# Patient Record
Sex: MALE | Race: WHITE | NOT HISPANIC OR LATINO | Employment: FULL TIME | URBAN - NONMETROPOLITAN AREA
[De-identification: names, ages, dates, MRNs, and addresses within clinical notes are randomized per-mention and may not be internally consistent; named-entity substitution may affect disease eponyms.]

---

## 2021-04-01 DIAGNOSIS — Z23 ENCOUNTER FOR IMMUNIZATION: ICD-10-CM

## 2021-04-02 ENCOUNTER — IMMUNIZATIONS (OUTPATIENT)
Dept: FAMILY MEDICINE CLINIC | Facility: HOSPITAL | Age: 41
End: 2021-04-02

## 2021-04-02 DIAGNOSIS — Z23 ENCOUNTER FOR IMMUNIZATION: Primary | ICD-10-CM

## 2021-04-02 PROCEDURE — 91301 SARS-COV-2 / COVID-19 MRNA VACCINE (MODERNA) 100 MCG: CPT

## 2021-04-02 PROCEDURE — 0011A SARS-COV-2 / COVID-19 MRNA VACCINE (MODERNA) 100 MCG: CPT

## 2021-05-04 ENCOUNTER — IMMUNIZATIONS (OUTPATIENT)
Dept: FAMILY MEDICINE CLINIC | Facility: HOSPITAL | Age: 41
End: 2021-05-04

## 2021-05-04 DIAGNOSIS — Z23 ENCOUNTER FOR IMMUNIZATION: Primary | ICD-10-CM

## 2021-05-04 PROCEDURE — 0012A SARS-COV-2 / COVID-19 MRNA VACCINE (MODERNA) 100 MCG: CPT

## 2021-05-04 PROCEDURE — 91301 SARS-COV-2 / COVID-19 MRNA VACCINE (MODERNA) 100 MCG: CPT

## 2021-05-21 ENCOUNTER — TRANSCRIBE ORDERS (OUTPATIENT)
Dept: ADMINISTRATIVE | Facility: HOSPITAL | Age: 41
End: 2021-05-21

## 2021-05-21 DIAGNOSIS — R59.0 LOCALIZED ENLARGED LYMPH NODES: Primary | ICD-10-CM

## 2021-05-28 ENCOUNTER — TELEPHONE (OUTPATIENT)
Dept: SURGICAL ONCOLOGY | Facility: CLINIC | Age: 41
End: 2021-05-28

## 2021-05-28 NOTE — TELEPHONE ENCOUNTER
New Patient Encounter    New Patient Intake Form   Patient Details:  Cindy Agrawal  1980  [id number]    Background Information:  [address] starts by opening a telephone encounter and gathering the following information   Who is calling to schedule? If not self, relationship to patient? Patient   Referring Provider Dr Radames Durand   What is the diagnosis? lymphadenopathy   Is this diagnosis confirmed? yes   When was the diagnosis? 5/2021   Is there a confirmed diagnosis from a biopsy/tissue reviewed by pathology? No   Were outside slides requested? NA   Is patient aware of diagnosis? yes   Is there a personal history and what kind? No   Is there a family history and what kind? Did Not Speak to Patient / Office Scheduled   Reason for visit? New Diagnosis   Have you had any imaging or labs done? If so: when, where? yes  Radiology and mri   Are records in EPIC? No  Getting them faxed   If patient has a prior history of cancer were old records obtained? NA   Was the patient told to bring a disk? Yes   Does the patient smoke or Vape? Did Not Speak to Patient / Office Scheduled   If yes, how many packs or cartridges per day? Scheduling Information:   Preferred [place]:  [place]     Are there any dates/time the patient cannot be seen? Miscellaneous:   Need ct scan which still is not in chart - office having it faxed and then we will call to schedule pt  After completing the above information, please route to Financial Counselor and the appropriate Nurse Navigator for review

## 2021-06-09 ENCOUNTER — HOSPITAL ENCOUNTER (OUTPATIENT)
Dept: RADIOLOGY | Facility: HOSPITAL | Age: 41
Discharge: HOME | End: 2021-06-09
Attending: RADIOLOGY

## 2021-06-09 ENCOUNTER — TELEPHONE (OUTPATIENT)
Dept: HEMATOLOGY ONCOLOGY | Facility: CLINIC | Age: 41
End: 2021-06-09

## 2021-06-09 DIAGNOSIS — Z71.2 PERSON CONSULTING FOR EXPLANATION OF EXAMINATION OR TEST FINDING: ICD-10-CM

## 2021-06-09 NOTE — TELEPHONE ENCOUNTER
Patient confirmed appt and went over covid screening questions  Patient would also like to confirm his imaging is available for this appt  He stated that he dropped a copy off to the  but does have an extra copy just lebron Angel can be reached at [phone]

## 2021-06-10 ENCOUNTER — CONSULT (OUTPATIENT)
Dept: SURGICAL ONCOLOGY | Facility: CLINIC | Age: 41
End: 2021-06-10
Payer: COMMERCIAL

## 2021-06-10 ENCOUNTER — CONSULT (OUTPATIENT)
Dept: HEMATOLOGY ONCOLOGY | Facility: CLINIC | Age: 41
End: 2021-06-10
Payer: COMMERCIAL

## 2021-06-10 ENCOUNTER — TELEPHONE (OUTPATIENT)
Dept: HEMATOLOGY ONCOLOGY | Facility: CLINIC | Age: 41
End: 2021-06-10

## 2021-06-10 ENCOUNTER — HOSPITAL ENCOUNTER (OUTPATIENT)
Dept: RADIOLOGY | Facility: HOSPITAL | Age: 41
Discharge: HOME | End: 2021-06-10
Attending: STUDENT IN AN ORGANIZED HEALTH CARE EDUCATION/TRAINING PROGRAM
Payer: COMMERCIAL

## 2021-06-10 ENCOUNTER — APPOINTMENT (OUTPATIENT)
Dept: LAB | Facility: HOSPITAL | Age: 41
End: 2021-06-10
Attending: INTERNAL MEDICINE
Payer: COMMERCIAL

## 2021-06-10 VITALS
BODY MASS INDEX: 28.58 KG/M2 | SYSTOLIC BLOOD PRESSURE: 112 MMHG | DIASTOLIC BLOOD PRESSURE: 72 MMHG | WEIGHT: 193 LBS | HEART RATE: 94 BPM | TEMPERATURE: 99.2 F | HEIGHT: 69 IN

## 2021-06-10 VITALS
HEART RATE: 94 BPM | TEMPERATURE: 99.2 F | SYSTOLIC BLOOD PRESSURE: 112 MMHG | DIASTOLIC BLOOD PRESSURE: 72 MMHG | RESPIRATION RATE: 18 BRPM | BODY MASS INDEX: 28.58 KG/M2 | WEIGHT: 193 LBS | HEIGHT: 69 IN | OXYGEN SATURATION: 96 %

## 2021-06-10 DIAGNOSIS — R59.0 LYMPHADENOPATHY, AXILLARY: ICD-10-CM

## 2021-06-10 DIAGNOSIS — R59.0 LYMPHADENOPATHY OF HEAD AND NECK REGION: Primary | ICD-10-CM

## 2021-06-10 DIAGNOSIS — R59.0 LYMPHADENOPATHY OF HEAD AND NECK REGION: ICD-10-CM

## 2021-06-10 DIAGNOSIS — R59.1 LYMPHADENOPATHY: Primary | ICD-10-CM

## 2021-06-10 DIAGNOSIS — R59.1 LYMPHADENOPATHY: ICD-10-CM

## 2021-06-10 LAB
ALBUMIN SERPL BCP-MCNC: 3.6 G/DL (ref 3.5–5)
ALP SERPL-CCNC: 210 U/L (ref 46–116)
ALT SERPL W P-5'-P-CCNC: 43 U/L (ref 12–78)
ANION GAP SERPL CALCULATED.3IONS-SCNC: 3 MMOL/L (ref 4–13)
AST SERPL W P-5'-P-CCNC: 33 U/L (ref 5–45)
BASOPHILS # BLD AUTO: 0.26 THOUSANDS/ΜL (ref 0–0.1)
BASOPHILS NFR BLD AUTO: 2 % (ref 0–1)
BILIRUB SERPL-MCNC: 0.6 MG/DL (ref 0.2–1)
BUN SERPL-MCNC: 6 MG/DL (ref 5–25)
CALCIUM SERPL-MCNC: 9.3 MG/DL (ref 8.3–10.1)
CHLORIDE SERPL-SCNC: 107 MMOL/L (ref 100–108)
CO2 SERPL-SCNC: 31 MMOL/L (ref 21–32)
CREAT SERPL-MCNC: 0.77 MG/DL (ref 0.6–1.3)
EOSINOPHIL # BLD AUTO: 3.91 THOUSAND/ΜL (ref 0–0.61)
EOSINOPHIL NFR BLD AUTO: 32 % (ref 0–6)
ERYTHROCYTE [DISTWIDTH] IN BLOOD BY AUTOMATED COUNT: 13 % (ref 11.6–15.1)
ERYTHROCYTE [SEDIMENTATION RATE] IN BLOOD: 6 MM/HOUR (ref 0–14)
GFR SERPL CREATININE-BSD FRML MDRD: 114 ML/MIN/1.73SQ M
GLUCOSE P FAST SERPL-MCNC: 83 MG/DL (ref 65–99)
HCT VFR BLD AUTO: 44.7 % (ref 36.5–49.3)
HGB BLD-MCNC: 14.3 G/DL (ref 12–17)
IMM GRANULOCYTES # BLD AUTO: 0.05 THOUSAND/UL (ref 0–0.2)
IMM GRANULOCYTES NFR BLD AUTO: 0 % (ref 0–2)
LDH SERPL-CCNC: 936 U/L (ref 81–234)
LYMPHOCYTES # BLD AUTO: 2.44 THOUSANDS/ΜL (ref 0.6–4.47)
LYMPHOCYTES NFR BLD AUTO: 20 % (ref 14–44)
MCH RBC QN AUTO: 30.7 PG (ref 26.8–34.3)
MCHC RBC AUTO-ENTMCNC: 32 G/DL (ref 31.4–37.4)
MCV RBC AUTO: 96 FL (ref 82–98)
MONOCYTES # BLD AUTO: 1.21 THOUSAND/ΜL (ref 0.17–1.22)
MONOCYTES NFR BLD AUTO: 10 % (ref 4–12)
NEUTROPHILS # BLD AUTO: 4.43 THOUSANDS/ΜL (ref 1.85–7.62)
NEUTS SEG NFR BLD AUTO: 36 % (ref 43–75)
NRBC BLD AUTO-RTO: 0 /100 WBCS
PLATELET # BLD AUTO: 248 THOUSANDS/UL (ref 149–390)
PMV BLD AUTO: 10.8 FL (ref 8.9–12.7)
POTASSIUM SERPL-SCNC: 4.6 MMOL/L (ref 3.5–5.3)
PROT SERPL-MCNC: 7.2 G/DL (ref 6.4–8.2)
RBC # BLD AUTO: 4.66 MILLION/UL (ref 3.88–5.62)
SODIUM SERPL-SCNC: 141 MMOL/L (ref 136–145)
WBC # BLD AUTO: 12.3 THOUSAND/UL (ref 4.31–10.16)

## 2021-06-10 PROCEDURE — 85025 COMPLETE CBC W/AUTO DIFF WBC: CPT

## 2021-06-10 PROCEDURE — 83615 LACTATE (LD) (LDH) ENZYME: CPT

## 2021-06-10 PROCEDURE — 1036F TOBACCO NON-USER: CPT | Performed by: STUDENT IN AN ORGANIZED HEALTH CARE EDUCATION/TRAINING PROGRAM

## 2021-06-10 PROCEDURE — 99244 OFF/OP CNSLTJ NEW/EST MOD 40: CPT | Performed by: STUDENT IN AN ORGANIZED HEALTH CARE EDUCATION/TRAINING PROGRAM

## 2021-06-10 PROCEDURE — 80053 COMPREHEN METABOLIC PANEL: CPT

## 2021-06-10 PROCEDURE — 85652 RBC SED RATE AUTOMATED: CPT

## 2021-06-10 PROCEDURE — 36415 COLL VENOUS BLD VENIPUNCTURE: CPT

## 2021-06-10 PROCEDURE — 99244 OFF/OP CNSLTJ NEW/EST MOD 40: CPT | Performed by: INTERNAL MEDICINE

## 2021-06-10 PROCEDURE — 3008F BODY MASS INDEX DOCD: CPT | Performed by: STUDENT IN AN ORGANIZED HEALTH CARE EDUCATION/TRAINING PROGRAM

## 2021-06-10 PROCEDURE — 71046 X-RAY EXAM CHEST 2 VIEWS: CPT

## 2021-06-10 RX ORDER — TRAMADOL HYDROCHLORIDE 50 MG/1
50 TABLET ORAL EVERY 6 HOURS PRN
Qty: 15 TABLET | Refills: 0 | Status: SHIPPED | OUTPATIENT
Start: 2021-06-10 | End: 2021-06-23

## 2021-06-10 RX ORDER — CEFAZOLIN SODIUM 2 G/50ML
2000 SOLUTION INTRAVENOUS ONCE
Status: CANCELLED | OUTPATIENT
Start: 2021-06-10 | End: 2021-06-10

## 2021-06-10 NOTE — PROGRESS NOTES
Surgical Oncology Consultation    [address] [facility] CANCER CARE SURGICAL ONCOLOGY [address] [facility]  [phone]    Patient:  Nilsa Alvarado  1980  [id number]    Primary Care provider:  Shiloh Olivarez, [address] [place] [address]    Referring provider:  No referring provider defined for this encounter  Diagnoses and all orders for this visit:    Lymphadenopathy  -     Case request operating room: EXCISIONAL BIOPSY OF LYMPH NODE WITH LYMPHOMA PROTOCOL, RIGHT NECK; Standing    Other orders  -     Incentive spirometry; Standing  -     Insert and maintain IV line; Standing  -     Void On-Call to O R ; Standing  -     Place sequential compression device; Standing  -     XR chest pa & lateral; Future  -     ceFAZolin (ANCEF) IVPB (premix in dextrose) 2,000 mg 50 mL  -     traMADol (ULTRAM) 50 mg tablet; Take 1 tablet (50 mg total) by mouth every 6 (six) hours as needed for moderate pain        Chief Complaint   Patient presents with    Consult     Pt here for consult for cervical and axillary lymphadenopathy  No follow-ups on file  Oncology History    No history exists  History of Present Illness  :   Naeem Cupl is a 52-year-old male seen here today for lymphadenopathy  Briefly, he states that he received his 2nd dose of the coronavirus vaccine in mid May  He had enlarged lymph nodes on the side of the vaccine, but when this did not resolve after 2 weeks and continued to grow, he sought the opinion of his primary care physician  A CT scan of the neck was obtained, showing bilateral cervical as well as mediastinal and preauricular lymph nodes  He was referred to Medical Oncology, who recommended excisional lymph node biopsy, he was thus referred to me today  He denies any symptomatology including fevers, night sweats, weight loss, tachycardia, diarrhea, nausea or vomiting        I personally reviewed this patient's clinical history, Hematology-Oncology consultation, reports of the CT scans  The images are unavailable for review at this time  I discussed the patient with Dr Lila Herrera  Review of Systems  Complete ROS Surg Onc:   Constitutional: The patient denies new or recent history of general fatigue, no recent weight loss, no change in appetite  Eyes: No complaints of visual problems, no scleral icterus  ENT: No complaints of ear pain, no hoarseness, no difficulty swallowing,  no tinnitus and no new masses in head, oral cavity, or neck  Cardiovascular: No complaints of chest pain, no palpitations, no ankle edema  Respiratory: No complaints of shortness of breath, no cough  Gastrointestinal: No complaints of jaundice, no bloody stools, no pale stools  Genitourinary: No complaints of dysuria, no hematuria, no nocturia, no frequent urination, no urethral discharge  Musculoskeletal: No complaints of weakness, paralysis, joint stiffness or arthralgias  Integumentary: No complaints of rash, no new lesions  Neurological: No complaints of convulsions, no seizures, no dizziness  Hematologic/Lymphatic: No complaints of easy bruising  Endocrine:  No hot or cold intolerance  No polydipsia, polyphagia, or polyuria  Allergy/immunology:  No environmental allergies  No food allergies  Not immunocompromised  Patient Active Problem List   Diagnosis    Lymphadenopathy     No past medical history on file  No past surgical history on file    Family History   Problem Relation Age of Onset    Cancer Maternal Grandfather      Social History     Socioeconomic History    Marital status: Single     Spouse name: Not on file    Number of children: Not on file    Years of education: Not on file    Highest education level: Not on file   Occupational History    Not on file   Social Needs    Financial resource strain: Not on file    Food insecurity     Worry: Not on file     Inability: Not on file   [employer] needs Medical: Not on file     Non-medical: Not on file   Tobacco Use    Smoking status: Former Smoker     Types: Cigarettes     Quit date: 2016     Years since quittin 4    Smokeless tobacco: Never Used   Substance and Sexual Activity    Alcohol use: Yes     Comment: rare    Drug use: Not on file    Sexual activity: Not on file   Lifestyle    Physical activity     Days per week: Not on file     Minutes per session: Not on file    Stress: Not on file   Relationships    Social connections     Talks on phone: Not on file     Gets together: Not on file     Attends Yarsani service: Not on file     Active member of club or organization: Not on file     Attends meetings of clubs or organizations: Not on file     Relationship status: Not on file    Intimate partner violence     Fear of current or ex partner: Not on file     Emotionally abused: Not on file     Physically abused: Not on file     Forced sexual activity: Not on file   Other Topics Concern    Not on file   Social History Narrative    Not on file     No current outpatient medications on file  No Known Allergies    Vitals:    06/10/21 1104   BP: 112/72   Pulse: 94   Temp: 99 2 °F (37 3 °C)       Physical Exam   General: Appears well, appears stated age  Skin: Warm, anicteric  HEENT: Normocephalic, atraumatic; sclera aniceteric, mucous membranes moist; cervical nodes without adenopathy  Cardiopulmonary: RRR, Easy WOB, no BLE edema  Abd: Flat and soft, nontender, no masses appreciated, no hepatosplenomegaly  MSK: Symmetric, no cyanosis, no overt weakness  Lymphatic: Significant bulky and mobile cervical, posterior auricular, axillary and inguinal lymphadenopathy  Neuro: Affect appropriate, no gross motor abnormalities    Labs: Reviewed in EPIC    Imaging  No results found  I independently reviewed and interpreted the above laboratory and imaging data  Discussion/Summary:      This is a 41-year-old gentleman with bilateral palpable cervical, axillary, and inguinal lymphadenopathy  He is asymptomatic, however the findings were concerning for lymphoproliferative disorder  Dr Kate Warren saw the patient today and has requested excisional biopsy  Given the increased risk of infection for axillary or inguinal incision, I have recommended right neck node excision  The risks of the procedure include bleeding, infection, seroma, injury to deeper structures  These risks were reviewed in full with the patient and we will proceed as soon as possible  I described that further treatment will be dictated by pathology  He expresses understanding

## 2021-06-10 NOTE — TELEPHONE ENCOUNTER
Intra-Department Referral    Patient Details:  Barbara Rivers  1980  [id number]   Background Information:  [address] starts by opening a telephone encounter and gathering the following information   Who is calling to schedule and relationship? Provider   Diagnosis Lymphadenopathy   Is this Cancer or Non-Cancer? Non-Cancer   What department was patient seen in last? Surg Onc   Scheduling Information:    [facility]   Are there any days the patient cannot be seen? Miscellaneous: Pt scheduled 6/10/21 w/Dr Drake Gillespie    After completing the above information, please route to nurse navigation, clinical trials (for re-evaluation) and [place]dal[place]

## 2021-06-10 NOTE — TELEPHONE ENCOUNTER
Spoke with pt and let him know that we did receive the hard copies and the images are also uploaded into PACS  Pt voiced understanding

## 2021-06-10 NOTE — LETTER
Azul 10, 2021     Corine Pierre,   [address] [facility] [address]    Patient: Cydney Gill   YOB: 1980   Date of Visit: 6/10/2021       Dear Dr Kwabena Ulloa: Thank you for referring Ronen Tim to me for evaluation  Below are my notes for this consultation  If you have questions, please do not hesitate to call me  I look forward to following your patient along with you  Sincerely,        Martín Diaz MD        CC: No Recipients  Martín Diaz MD  6/10/2021 11:57 AM  Sign when Signing Visit  Surgical Oncology Consultation    [place] [address]  [address] 12[address] [facility]  [phone]    Patient:  Cydney Gill  1980  [id number]    Primary Care provider:  Jordan Walters, [address] [place] [address]    Referring provider:  No referring provider defined for this encounter  Diagnoses and all orders for this visit:    Lymphadenopathy  -     Case request operating room: EXCISIONAL BIOPSY OF LYMPH NODE WITH LYMPHOMA PROTOCOL, RIGHT NECK; Standing    Other orders  -     Incentive spirometry; Standing  -     Insert and maintain IV line; Standing  -     Void On-Call to O R ; Standing  -     Place sequential compression device; Standing  -     XR chest pa & lateral; Future  -     ceFAZolin (ANCEF) IVPB (premix in dextrose) 2,000 mg 50 mL  -     traMADol (ULTRAM) 50 mg tablet; Take 1 tablet (50 mg total) by mouth every 6 (six) hours as needed for moderate pain        Chief Complaint   Patient presents with    Consult     Pt here for consult for cervical and axillary lymphadenopathy  No follow-ups on file  Oncology History    No history exists  History of Present Illness  :   Travis Guillaume is a 61-year-old male seen here today for lymphadenopathy  Briefly, he states that he received his 2nd dose of the coronavirus vaccine in mid May    He had enlarged lymph nodes on the side of the vaccine, but when this did not resolve after 2 weeks and continued to grow, he sought the opinion of his primary care physician  A CT scan of the neck was obtained, showing bilateral cervical as well as mediastinal and preauricular lymph nodes  He was referred to Medical Oncology, who recommended excisional lymph node biopsy, he was thus referred to me today  He denies any symptomatology including fevers, night sweats, weight loss, tachycardia, diarrhea, nausea or vomiting  I personally reviewed this patient's clinical history, Hematology-Oncology consultation, reports of the CT scans  The images are unavailable for review at this time  I discussed the patient with Dr Francis Rebollar  Review of Systems  Complete ROS Surg Onc:   Constitutional: The patient denies new or recent history of general fatigue, no recent weight loss, no change in appetite  Eyes: No complaints of visual problems, no scleral icterus  ENT: No complaints of ear pain, no hoarseness, no difficulty swallowing,  no tinnitus and no new masses in head, oral cavity, or neck  Cardiovascular: No complaints of chest pain, no palpitations, no ankle edema  Respiratory: No complaints of shortness of breath, no cough  Gastrointestinal: No complaints of jaundice, no bloody stools, no pale stools  Genitourinary: No complaints of dysuria, no hematuria, no nocturia, no frequent urination, no urethral discharge  Musculoskeletal: No complaints of weakness, paralysis, joint stiffness or arthralgias  Integumentary: No complaints of rash, no new lesions  Neurological: No complaints of convulsions, no seizures, no dizziness  Hematologic/Lymphatic: No complaints of easy bruising  Endocrine:  No hot or cold intolerance  No polydipsia, polyphagia, or polyuria  Allergy/immunology:  No environmental allergies  No food allergies  Not immunocompromised        Patient Active Problem List   Diagnosis    Lymphadenopathy     No past medical history on file  No past surgical history on file  Family History   Problem Relation Age of Onset    Cancer Maternal Grandfather      Social History     Socioeconomic History    Marital status: Single     Spouse name: Not on file    Number of children: Not on file    Years of education: Not on file    Highest education level: Not on file   Occupational History    Not on file   Social Needs    Financial resource strain: Not on file    Food insecurity     Worry: Not on file     Inability: Not on file    Transportation needs     Medical: Not on file     Non-medical: Not on file   Tobacco Use    Smoking status: Former Smoker     Types: Cigarettes     Quit date:      Years since quittin 4    Smokeless tobacco: Never Used   Substance and Sexual Activity    Alcohol use: Yes     Comment: rare    Drug use: Not on file    Sexual activity: Not on file   Lifestyle    Physical activity     Days per week: Not on file     Minutes per session: Not on file    Stress: Not on file   Relationships    Social connections     Talks on phone: Not on file     Gets together: Not on file     Attends Religion service: Not on file     Active member of club or organization: Not on file     Attends meetings of clubs or organizations: Not on file     Relationship status: Not on file    Intimate partner violence     Fear of current or ex partner: Not on file     Emotionally abused: Not on file     Physically abused: Not on file     Forced sexual activity: Not on file   Other Topics Concern    Not on file   Social History Narrative    Not on file     No current outpatient medications on file    No Known Allergies    Vitals:    06/10/21 1104   BP: 112/72   Pulse: 94   Temp: 99 2 °F (37 3 °C)       Physical Exam   General: Appears well, appears stated age  Skin: Warm, anicteric  HEENT: Normocephalic, atraumatic; sclera aniceteric, mucous membranes moist; cervical nodes without adenopathy  Cardiopulmonary: RRR, Easy WOB, no BLE edema  Abd: Flat and soft, nontender, no masses appreciated, no hepatosplenomegaly  MSK: Symmetric, no cyanosis, no overt weakness  Lymphatic: Significant bulky and mobile cervical, posterior auricular, axillary and inguinal lymphadenopathy  Neuro: Affect appropriate, no gross motor abnormalities    Labs: Reviewed in EPIC    Imaging  No results found  I independently reviewed and interpreted the above laboratory and imaging data  Discussion/Summary: This is a 64-year-old gentleman with bilateral palpable cervical, axillary, and inguinal lymphadenopathy  He is asymptomatic, however the findings were concerning for lymphoproliferative disorder  Dr Flor Jesus saw the patient today and has requested excisional biopsy  Given the increased risk of infection for axillary or inguinal incision, I have recommended right neck node excision  The risks of the procedure include bleeding, infection, seroma, injury to deeper structures  These risks were reviewed in full with the patient and we will proceed as soon as possible  I described that further treatment will be dictated by pathology  He expresses understanding

## 2021-06-10 NOTE — H&P (VIEW-ONLY)
Surgical Oncology Consultation    [address] [facility] CANCER CARE SURGICAL ONCOLOGY Oskaratto Rhodes  [address] [place] [address] Drive [address] [facility]  [phone]    Patient:  Radha Camera  1980  [id number]    Primary Care provider:  Holly Delgadillo, [address] [place] [id number]    Referring provider:  No referring provider defined for this encounter  Diagnoses and all orders for this visit:    Lymphadenopathy  -     Case request operating room: EXCISIONAL BIOPSY OF LYMPH NODE WITH LYMPHOMA PROTOCOL, RIGHT NECK; Standing    Other orders  -     Incentive spirometry; Standing  -     Insert and maintain IV line; Standing  -     Void On-Call to O R ; Standing  -     Place sequential compression device; Standing  -     XR chest pa & lateral; Future  -     ceFAZolin (ANCEF) IVPB (premix in dextrose) 2,000 mg 50 mL  -     traMADol (ULTRAM) 50 mg tablet; Take 1 tablet (50 mg total) by mouth every 6 (six) hours as needed for moderate pain        Chief Complaint   Patient presents with    Consult     Pt here for consult for cervical and axillary lymphadenopathy  No follow-ups on file  Oncology History    No history exists  History of Present Illness  :   Amandeep Vasquze is a 78-year-old male seen here today for lymphadenopathy  Briefly, he states that he received his 2nd dose of the coronavirus vaccine in mid May  He had enlarged lymph nodes on the side of the vaccine, but when this did not resolve after 2 weeks and continued to grow, he sought the opinion of his primary care physician  A CT scan of the neck was obtained, showing bilateral cervical as well as mediastinal and preauricular lymph nodes  He was referred to Medical Oncology, who recommended excisional lymph node biopsy, he was thus referred to me today  He denies any symptomatology including fevers, night sweats, weight loss, tachycardia, diarrhea, nausea or vomiting        I personally reviewed this patient's clinical history, Hematology-Oncology consultation, reports of the CT scans  The images are unavailable for review at this time  I discussed the patient with Dr Lizette Victoria  Review of Systems  Complete ROS Surg Onc:   Constitutional: The patient denies new or recent history of general fatigue, no recent weight loss, no change in appetite  Eyes: No complaints of visual problems, no scleral icterus  ENT: No complaints of ear pain, no hoarseness, no difficulty swallowing,  no tinnitus and no new masses in head, oral cavity, or neck  Cardiovascular: No complaints of chest pain, no palpitations, no ankle edema  Respiratory: No complaints of shortness of breath, no cough  Gastrointestinal: No complaints of jaundice, no bloody stools, no pale stools  Genitourinary: No complaints of dysuria, no hematuria, no nocturia, no frequent urination, no urethral discharge  Musculoskeletal: No complaints of weakness, paralysis, joint stiffness or arthralgias  Integumentary: No complaints of rash, no new lesions  Neurological: No complaints of convulsions, no seizures, no dizziness  Hematologic/Lymphatic: No complaints of easy bruising  Endocrine:  No hot or cold intolerance  No polydipsia, polyphagia, or polyuria  Allergy/immunology:  No environmental allergies  No food allergies  Not immunocompromised  Patient Active Problem List   Diagnosis    Lymphadenopathy     No past medical history on file  No past surgical history on file    Family History   Problem Relation Age of Onset    Cancer Maternal Grandfather      Social History     Socioeconomic History    Marital status: Single     Spouse name: Not on file    Number of children: Not on file    Years of education: Not on file    Highest education level: Not on file   Occupational History    Not on file   Social Needs    Financial resource strain: Not on file    Food insecurity     Worry: Not on file     Inability: Not on file   [employer] needs Medical: Not on file     Non-medical: Not on file   Tobacco Use    Smoking status: Former Smoker     Types: Cigarettes     Quit date: 2016     Years since quittin 4    Smokeless tobacco: Never Used   Substance and Sexual Activity    Alcohol use: Yes     Comment: rare    Drug use: Not on file    Sexual activity: Not on file   Lifestyle    Physical activity     Days per week: Not on file     Minutes per session: Not on file    Stress: Not on file   Relationships    Social connections     Talks on phone: Not on file     Gets together: Not on file     Attends Worship service: Not on file     Active member of club or organization: Not on file     Attends meetings of clubs or organizations: Not on file     Relationship status: Not on file    Intimate partner violence     Fear of current or ex partner: Not on file     Emotionally abused: Not on file     Physically abused: Not on file     Forced sexual activity: Not on file   Other Topics Concern    Not on file   Social History Narrative    Not on file     No current outpatient medications on file  No Known Allergies    Vitals:    06/10/21 1104   BP: 112/72   Pulse: 94   Temp: 99 2 °F (37 3 °C)       Physical Exam   General: Appears well, appears stated age  Skin: Warm, anicteric  HEENT: Normocephalic, atraumatic; sclera aniceteric, mucous membranes moist; cervical nodes without adenopathy  Cardiopulmonary: RRR, Easy WOB, no BLE edema  Abd: Flat and soft, nontender, no masses appreciated, no hepatosplenomegaly  MSK: Symmetric, no cyanosis, no overt weakness  Lymphatic: Significant bulky and mobile cervical, posterior auricular, axillary and inguinal lymphadenopathy  Neuro: Affect appropriate, no gross motor abnormalities    Labs: Reviewed in EPIC    Imaging  No results found  I independently reviewed and interpreted the above laboratory and imaging data  Discussion/Summary:      This is a 28-year-old gentleman with bilateral palpable cervical, axillary, and inguinal lymphadenopathy  He is asymptomatic, however the findings were concerning for lymphoproliferative disorder  Dr Rico Dias saw the patient today and has requested excisional biopsy  Given the increased risk of infection for axillary or inguinal incision, I have recommended right neck node excision  The risks of the procedure include bleeding, infection, seroma, injury to deeper structures  These risks were reviewed in full with the patient and we will proceed as soon as possible  I described that further treatment will be dictated by pathology  He expresses understanding

## 2021-06-11 NOTE — PRE-PROCEDURE INSTRUCTIONS
Pre-Surgery Instructions:   Medication Instructions    traMADol (ULTRAM) 50 mg tablet Post Op      My Surgical Experience    The following information was developed to assist you to prepare for your operation  What do I need to do before coming to the hospital?   Arrange for a responsible person to drive you to and from the hospital    Arrange care for your children at home  Children are not allowed in the recovery areas of the hospital   Plan to wear clothing that is easy to put on and take off  If you are having shoulder surgery, wear a shirt that buttons or zippers in the front  Bathing  o Shower the evening before and the morning of your surgery with an antibacterial soap  Please refer to the Pre Op Showering Instructions for Surgery Patients Sheet   o Remove nail polish and all body piercing jewelry  o Do not shave any body part for at least 24 hours before surgery-this includes face, arms, legs and upper body  Food  o Nothing to eat or drink after midnight the night before your surgery  This includes candy and chewing gum  o Exception: If your surgery is after 12:00pm (noon), you may have clear liquids such as 7-Up®, ginger ale, apple or cranberry juice, Jell-O®, water, or clear broth until 8:00 am  o Do not drink milk or juice with pulp on the morning before surgery  o Do not drink alcohol 24 hours before surgery  Medicine  o Follow instructions you received from your surgeon about which medicines you may take on the day of surgery  o If instructed to take medicine on the morning of surgery, take pills with just a small sip of water  Call your prescribing doctor for specific infroamtion on what to do if you take insulin    What should I bring to the hospital?    Bring:  Shannan Bojorquez or a walker, if you have them, for foot or knee surgery   A list of the daily medicines, vitamins, minerals, herbals and nutritional supplements you take   Include the dosages of medicines and the time you take them each day   Glasses, dentures or hearing aids   Minimal clothing; you will be wearing hospital sleepwear   Photo ID; required to verify your identity   If you have a Living Will or Power of , bring a copy of the documents   If you have an ostomy, bring an extra pouch and any supplies you use    Do not bring   Medicines or inhalers   Money, valuables or jewelry    What other information should I know about the day of surgery?  Notify your surgeons if you develop a cold, sore throat, cough, fever, rash or any other illness   Report to the Ambulatory Surgical/Same Day Surgery Unit   You will be instructed to stop at Registration only if you have not been pre-registered   Inform your  fi they do not stay that they will be asked by the staff to leave a phone number where they can be reached   Be available to be reached before surgery  In the event the operating room schedule changes, you may be asked to come in earlier or later than expected    *It is important to tell your doctor and others involved in your health care if you are taking or have been taking any non-prescription drugs, vitamins, minerals, herbals or other nutritional supplements   Any of these may interact with some food or medicines and cause a reaction

## 2021-06-15 ENCOUNTER — TELEPHONE (OUTPATIENT)
Dept: SURGICAL ONCOLOGY | Facility: CLINIC | Age: 41
End: 2021-06-15

## 2021-06-15 NOTE — TELEPHONE ENCOUNTER
Dr Shital Oconnor feels strongly that he should proceed with surgery  Left a vm for pt that he can come in on Thursday to be seen if he wants

## 2021-06-15 NOTE — TELEPHONE ENCOUNTER
Patient has surgery set for 6/17 with Dr Allan Joy  He stated that the swelling has gone down since the last time he was seen in the office and would like to know if he should be seen once more before the surgery       Patient can be reached at [phone]

## 2021-06-16 ENCOUNTER — ANESTHESIA EVENT (OUTPATIENT)
Dept: PERIOP | Facility: HOSPITAL | Age: 41
End: 2021-06-16
Payer: COMMERCIAL

## 2021-06-17 ENCOUNTER — HOSPITAL ENCOUNTER (OUTPATIENT)
Facility: HOSPITAL | Age: 41
Setting detail: OUTPATIENT SURGERY
Discharge: HOME | End: 2021-06-17
Attending: STUDENT IN AN ORGANIZED HEALTH CARE EDUCATION/TRAINING PROGRAM | Admitting: STUDENT IN AN ORGANIZED HEALTH CARE EDUCATION/TRAINING PROGRAM
Payer: COMMERCIAL

## 2021-06-17 ENCOUNTER — ANESTHESIA (OUTPATIENT)
Dept: PERIOP | Facility: HOSPITAL | Age: 41
End: 2021-06-17
Payer: COMMERCIAL

## 2021-06-17 VITALS
HEART RATE: 113 BPM | RESPIRATION RATE: 18 BRPM | WEIGHT: 190 LBS | DIASTOLIC BLOOD PRESSURE: 90 MMHG | BODY MASS INDEX: 28.14 KG/M2 | SYSTOLIC BLOOD PRESSURE: 135 MMHG | HEIGHT: 69 IN | OXYGEN SATURATION: 92 % | TEMPERATURE: 97.8 F

## 2021-06-17 DIAGNOSIS — R59.1 LYMPHADENOPATHY: ICD-10-CM

## 2021-06-17 PROCEDURE — 81407 MOPATH PROCEDURE LEVEL 8: CPT

## 2021-06-17 PROCEDURE — 81347 SF3B1 GENE COMMON VARIANTS: CPT

## 2021-06-17 PROCEDURE — 88341 IMHCHEM/IMCYTCHM EA ADD ANTB: CPT | Performed by: PATHOLOGY

## 2021-06-17 PROCEDURE — 38510 BIOPSY/REMOVAL LYMPH NODES: CPT | Performed by: STUDENT IN AN ORGANIZED HEALTH CARE EDUCATION/TRAINING PROGRAM

## 2021-06-17 PROCEDURE — 88360 TUMOR IMMUNOHISTOCHEM/MANUAL: CPT

## 2021-06-17 PROCEDURE — 88342 IMHCHEM/IMCYTCHM 1ST ANTB: CPT

## 2021-06-17 PROCEDURE — 88185 FLOWCYTOMETRY/TC ADD-ON: CPT

## 2021-06-17 PROCEDURE — 88342 IMHCHEM/IMCYTCHM 1ST ANTB: CPT | Performed by: PATHOLOGY

## 2021-06-17 PROCEDURE — 88365 INSITU HYBRIDIZATION (FISH): CPT

## 2021-06-17 PROCEDURE — 88184 FLOWCYTOMETRY/ TC 1 MARKER: CPT | Performed by: STUDENT IN AN ORGANIZED HEALTH CARE EDUCATION/TRAINING PROGRAM

## 2021-06-17 PROCEDURE — 81305 MYD88 GENE P.LEU265PRO VRNT: CPT

## 2021-06-17 PROCEDURE — 88331 PATH CONSLTJ SURG 1 BLK 1SPC: CPT | Performed by: PATHOLOGY

## 2021-06-17 PROCEDURE — 88305 TISSUE EXAM BY PATHOLOGIST: CPT | Performed by: PATHOLOGY

## 2021-06-17 PROCEDURE — 81351 TP53 GENE FULL GENE SEQUENCE: CPT

## 2021-06-17 PROCEDURE — 81342 TRG GENE REARRANGEMENT ANAL: CPT

## 2021-06-17 PROCEDURE — 81236 EZH2 GENE FULL GENE SEQUENCE: CPT

## 2021-06-17 PROCEDURE — 81479 UNLISTED MOLECULAR PATHOLOGY: CPT

## 2021-06-17 PROCEDURE — 81120 IDH1 COMMON VARIANTS: CPT

## 2021-06-17 PROCEDURE — 81210 BRAF GENE: CPT

## 2021-06-17 PROCEDURE — 88341 IMHCHEM/IMCYTCHM EA ADD ANTB: CPT

## 2021-06-17 RX ORDER — SODIUM CHLORIDE, SODIUM LACTATE, POTASSIUM CHLORIDE, CALCIUM CHLORIDE 600; 310; 30; 20 MG/100ML; MG/100ML; MG/100ML; MG/100ML
125 INJECTION, SOLUTION INTRAVENOUS CONTINUOUS
Status: DISCONTINUED | OUTPATIENT
Start: 2021-06-17 | End: 2021-06-17 | Stop reason: HOSPADM

## 2021-06-17 RX ORDER — NEOSTIGMINE METHYLSULFATE 1 MG/ML
INJECTION INTRAVENOUS AS NEEDED
Status: DISCONTINUED | OUTPATIENT
Start: 2021-06-17 | End: 2021-06-17

## 2021-06-17 RX ORDER — SODIUM CHLORIDE, SODIUM LACTATE, POTASSIUM CHLORIDE, CALCIUM CHLORIDE 600; 310; 30; 20 MG/100ML; MG/100ML; MG/100ML; MG/100ML
INJECTION, SOLUTION INTRAVENOUS CONTINUOUS PRN
Status: DISCONTINUED | OUTPATIENT
Start: 2021-06-17 | End: 2021-06-17

## 2021-06-17 RX ORDER — MIDAZOLAM HYDROCHLORIDE 2 MG/2ML
INJECTION, SOLUTION INTRAMUSCULAR; INTRAVENOUS AS NEEDED
Status: DISCONTINUED | OUTPATIENT
Start: 2021-06-17 | End: 2021-06-17

## 2021-06-17 RX ORDER — ONDANSETRON 2 MG/ML
4 INJECTION INTRAMUSCULAR; INTRAVENOUS ONCE AS NEEDED
Status: DISCONTINUED | OUTPATIENT
Start: 2021-06-17 | End: 2021-06-17 | Stop reason: HOSPADM

## 2021-06-17 RX ORDER — SUCCINYLCHOLINE/SOD CL,ISO/PF 100 MG/5ML
SYRINGE (ML) INTRAVENOUS AS NEEDED
Status: DISCONTINUED | OUTPATIENT
Start: 2021-06-17 | End: 2021-06-17

## 2021-06-17 RX ORDER — LIDOCAINE HYDROCHLORIDE 10 MG/ML
0.5 INJECTION, SOLUTION EPIDURAL; INFILTRATION; INTRACAUDAL; PERINEURAL ONCE AS NEEDED
Status: COMPLETED | OUTPATIENT
Start: 2021-06-17 | End: 2021-06-17

## 2021-06-17 RX ORDER — ONDANSETRON 2 MG/ML
INJECTION INTRAMUSCULAR; INTRAVENOUS AS NEEDED
Status: DISCONTINUED | OUTPATIENT
Start: 2021-06-17 | End: 2021-06-17

## 2021-06-17 RX ORDER — ROCURONIUM BROMIDE 10 MG/ML
INJECTION, SOLUTION INTRAVENOUS AS NEEDED
Status: DISCONTINUED | OUTPATIENT
Start: 2021-06-17 | End: 2021-06-17

## 2021-06-17 RX ORDER — CEFAZOLIN SODIUM 2 G/50ML
2000 SOLUTION INTRAVENOUS ONCE
Status: COMPLETED | OUTPATIENT
Start: 2021-06-17 | End: 2021-06-17

## 2021-06-17 RX ORDER — SODIUM CHLORIDE, SODIUM LACTATE, POTASSIUM CHLORIDE, CALCIUM CHLORIDE 600; 310; 30; 20 MG/100ML; MG/100ML; MG/100ML; MG/100ML
100 INJECTION, SOLUTION INTRAVENOUS CONTINUOUS
Status: CANCELLED | OUTPATIENT
Start: 2021-06-17

## 2021-06-17 RX ORDER — DEXAMETHASONE SODIUM PHOSPHATE 10 MG/ML
INJECTION, SOLUTION INTRAMUSCULAR; INTRAVENOUS AS NEEDED
Status: DISCONTINUED | OUTPATIENT
Start: 2021-06-17 | End: 2021-06-17

## 2021-06-17 RX ORDER — LIDOCAINE HYDROCHLORIDE 10 MG/ML
INJECTION, SOLUTION EPIDURAL; INFILTRATION; INTRACAUDAL; PERINEURAL AS NEEDED
Status: DISCONTINUED | OUTPATIENT
Start: 2021-06-17 | End: 2021-06-17

## 2021-06-17 RX ORDER — PROPOFOL 10 MG/ML
INJECTION, EMULSION INTRAVENOUS AS NEEDED
Status: DISCONTINUED | OUTPATIENT
Start: 2021-06-17 | End: 2021-06-17

## 2021-06-17 RX ORDER — FENTANYL CITRATE/PF 50 MCG/ML
25 SYRINGE (ML) INJECTION
Status: DISCONTINUED | OUTPATIENT
Start: 2021-06-17 | End: 2021-06-17 | Stop reason: HOSPADM

## 2021-06-17 RX ORDER — HYDROMORPHONE HCL IN WATER/PF 6 MG/30 ML
0.2 PATIENT CONTROLLED ANALGESIA SYRINGE INTRAVENOUS
Status: DISCONTINUED | OUTPATIENT
Start: 2021-06-17 | End: 2021-06-17 | Stop reason: HOSPADM

## 2021-06-17 RX ORDER — FENTANYL CITRATE 50 UG/ML
INJECTION, SOLUTION INTRAMUSCULAR; INTRAVENOUS AS NEEDED
Status: DISCONTINUED | OUTPATIENT
Start: 2021-06-17 | End: 2021-06-17

## 2021-06-17 RX ORDER — GLYCOPYRROLATE 0.2 MG/ML
INJECTION INTRAMUSCULAR; INTRAVENOUS AS NEEDED
Status: DISCONTINUED | OUTPATIENT
Start: 2021-06-17 | End: 2021-06-17

## 2021-06-17 RX ADMIN — GLYCOPYRROLATE 0.6 MG: 0.2 INJECTION, SOLUTION INTRAMUSCULAR; INTRAVENOUS at 13:05

## 2021-06-17 RX ADMIN — ONDANSETRON 4 MG: 2 INJECTION INTRAMUSCULAR; INTRAVENOUS at 12:37

## 2021-06-17 RX ADMIN — LIDOCAINE HYDROCHLORIDE 50 MG: 10 INJECTION, SOLUTION EPIDURAL; INFILTRATION; INTRACAUDAL; PERINEURAL at 12:06

## 2021-06-17 RX ADMIN — PHENYLEPHRINE HYDROCHLORIDE 100 MCG: 10 INJECTION INTRAVENOUS at 12:25

## 2021-06-17 RX ADMIN — MIDAZOLAM 2 MG: 1 INJECTION INTRAMUSCULAR; INTRAVENOUS at 11:55

## 2021-06-17 RX ADMIN — DEXAMETHASONE SODIUM PHOSPHATE 10 MG: 10 INJECTION, SOLUTION INTRAMUSCULAR; INTRAVENOUS at 12:37

## 2021-06-17 RX ADMIN — SODIUM CHLORIDE, SODIUM LACTATE, POTASSIUM CHLORIDE, AND CALCIUM CHLORIDE: .6; .31; .03; .02 INJECTION, SOLUTION INTRAVENOUS at 11:33

## 2021-06-17 RX ADMIN — PHENYLEPHRINE HYDROCHLORIDE 100 MCG: 10 INJECTION INTRAVENOUS at 12:20

## 2021-06-17 RX ADMIN — NEOSTIGMINE METHYLSULFATE 3 MG: 1 INJECTION INTRAVENOUS at 13:05

## 2021-06-17 RX ADMIN — FENTANYL CITRATE 100 MCG: 50 INJECTION INTRAMUSCULAR; INTRAVENOUS at 12:50

## 2021-06-17 RX ADMIN — CEFAZOLIN SODIUM 2000 MG: 2 SOLUTION INTRAVENOUS at 12:04

## 2021-06-17 RX ADMIN — ROCURONIUM BROMIDE 50 MG: 50 INJECTION, SOLUTION INTRAVENOUS at 12:06

## 2021-06-17 RX ADMIN — FENTANYL CITRATE 100 MCG: 50 INJECTION INTRAMUSCULAR; INTRAVENOUS at 12:06

## 2021-06-17 RX ADMIN — LIDOCAINE HYDROCHLORIDE 0.5 ML: 10 INJECTION, SOLUTION EPIDURAL; INFILTRATION; INTRACAUDAL; PERINEURAL at 10:26

## 2021-06-17 RX ADMIN — Medication 25 MCG: at 13:53

## 2021-06-17 RX ADMIN — SODIUM CHLORIDE, SODIUM LACTATE, POTASSIUM CHLORIDE, AND CALCIUM CHLORIDE 125 ML/HR: .6; .31; .03; .02 INJECTION, SOLUTION INTRAVENOUS at 10:26

## 2021-06-17 RX ADMIN — PROPOFOL 200 MG: 10 INJECTION, EMULSION INTRAVENOUS at 12:06

## 2021-06-17 NOTE — ANESTHESIA PREPROCEDURE EVALUATION
Procedure:  EXCISIONAL BIOPSY OF RIGHT NECK LYMPH NODE WITH LYMPHOMA PROTOCOL (Right Neck)    Relevant Problems   No relevant active problems        Physical Exam    Airway    Mallampati score: II  TM Distance: >3 FB  Neck ROM: full     Dental       Cardiovascular      Pulmonary      Other Findings  Full neck, no loose/broken teeth      Anesthesia Plan  ASA Score- 2     Anesthesia Type- general with ASA Monitors  Additional Monitors:   Airway Plan: LMA  Comment: General anesthesia, LMA; standard ASA monitors  Risks and benefits discussed with patient; patient consented and agrees to proceed  I saw and evaluated the patient  If seen with CRNA, we have discussed the anesthetic plan and I am in agreement that the plan is appropriate for the patient          Plan Factors-    Chart reviewed  Existing labs reviewed  Induction- intravenous  Postoperative Plan- Plan for postoperative opioid use  Planned trial extubation    Informed Consent- Anesthetic plan and risks discussed with patient  I personally reviewed this patient with the CRNA  Discussed and agreed on the Anesthesia Plan with the CRNA  Dejah Chávez

## 2021-06-17 NOTE — ANESTHESIA POSTPROCEDURE EVALUATION
Post-Op Assessment Note    CV Status:  Stable  Pain Score: 0    Pain management: adequate     Mental Status:  Alert and awake   Hydration Status:  Euvolemic   PONV Controlled:  Controlled   Airway Patency:  Patent      Post Op Vitals Reviewed: Yes      Staff: CRNA         No complications documented      BP   134/77   Temp  99 2   Pulse   98   Resp   14   SpO2   98%

## 2021-06-17 NOTE — OP NOTE
OPERATIVE REPORT  PATIENT NAME: Fermin Jenkins    :  1980  MRN: [id number]  Pt Location: [facility] OR ROOM 07    SURGERY DATE: 2021    Surgeon(s) and Role:     * Heidy Lopez MD - Primary     * Vinod Marshall MD - Assisting    Preop Diagnosis:  Lymphadenopathy [R59 1]    Post-Op Diagnosis Codes:     * Lymphadenopathy [R59 1]    Procedure(s) (LRB):  EXCISIONAL BIOPSY OF RIGHT NECK LYMPH NODE WITH LYMPHOMA PROTOCOL (Right)    Specimen(s):  ID Type Source Tests Collected by Time Destination   1 :  Tissue Lymph Node - Lymphoma Prtocol TISSUE EXAM, LEUKEMIA/LYMPHOMA FLOW CYTOMETRY Heidy Lopez MD 2021 1231        Estimated Blood Loss:   Minimal    Drains:  * No LDAs found *    Anesthesia Type:   General    Operative Indications:  Lymphadenopathy [R59 1]    Operative Findings:  Right neck lymphadenopathy  Viable tissue on frozen section    Complications:   None    Procedure and Technique:  Patient was brought to the operating room and identified verbally and via wristband  He was transferred to the operating room table in supine position  General endotracheal anesthesia was induced successfully  The patient's head was partially turned to expose the right neck which was prepped and draped in the usual sterile fashion  Pre-operative antibiotics were administered  A time-out was called confirming the patient, site, and procedure  All parties were in agreement  Local anesthesia was injected and a small incision was made in the right neck overlying the area of the palpable lymphadenopathy  The subcutaneous tissue and platysma were divided with cautery  The sternocleidomastoid was split and partially divided with cautery  A large necrotic lymph node was identified just deep to this  This was sampled multiple times and sent to pathology  Frozen section confirmed presence of viable lymphatic tissue  Additional tissue was sent for flow cytometry per lymphoma protocol   The wound was packed with surgicel and hemostasis was assured  The wound was irrigated and re-inspected  There was excellent hemostasis  The wound was irrigated and suctioned clear  The platysma was closed with Vicryl suture, the deep dermal layer was re-approximated with Vicryl and the skin was closed with Stratafix in a subcuticular fashion  The wound was then dressed with sterile glue and Steri-strips  The patient was then allowed to awaken, extubated, and transferred to the PACU having tolerated the procedure well  All instrument, needle, and sponge counts were correct at the end of the case  Radiofrequency detection was negative  Dr Kushal Dunlap was present for the entire procedure      Patient Disposition:  PACU     SIGNATURE: Heather Tomlinson MD  DATE: June 17, 2021  TIME: 12:55 PM

## 2021-06-17 NOTE — INTERVAL H&P NOTE
H&P reviewed  After examining the patient I find no changes in the patients condition since the H&P had been written      Vitals:    06/17/21 0921   BP: 132/74   Pulse: 97   Resp: 19   Temp: (!) 97 1 °F (36 2 °C)   SpO2: 97%

## 2021-06-23 ENCOUNTER — OFFICE VISIT (OUTPATIENT)
Dept: HEMATOLOGY ONCOLOGY | Facility: CLINIC | Age: 41
End: 2021-06-23
Payer: COMMERCIAL

## 2021-06-23 VITALS
RESPIRATION RATE: 17 BRPM | SYSTOLIC BLOOD PRESSURE: 124 MMHG | DIASTOLIC BLOOD PRESSURE: 76 MMHG | WEIGHT: 187.4 LBS | BODY MASS INDEX: 27.76 KG/M2 | HEIGHT: 69 IN | OXYGEN SATURATION: 97 % | TEMPERATURE: 98.8 F | HEART RATE: 103 BPM

## 2021-06-23 DIAGNOSIS — R59.0 LYMPHADENOPATHY OF HEAD AND NECK REGION: Primary | ICD-10-CM

## 2021-06-23 LAB — SCAN RESULT: NORMAL

## 2021-06-23 PROCEDURE — 1036F TOBACCO NON-USER: CPT | Performed by: INTERNAL MEDICINE

## 2021-06-23 PROCEDURE — 99213 OFFICE O/P EST LOW 20 MIN: CPT | Performed by: INTERNAL MEDICINE

## 2021-06-23 NOTE — PROGRESS NOTES
[facility] [place]  [address] [facility] [id number] [phone]  [address] [facility] Caren Mcgrath [id number]  06/23/21    Discussion:   In summary, this is a 78-year-old male history of lymphadenopathy as previously outlined  A week ago he had biopsy of right cervical node  Flow cytometry was negative  I called the pathology Department  Lymphoma staining protocol is in progress  We anticipate final result tomorrow  We reviewed that T-cell non-Hodgkin's lymphomas or Hodgkin's lymphoma are not ruled out by normal flow cytometry  This is strong evidence against the possibility of a B-cell lymphoma, however  To move forward, we will make arrangements for PET-CT  I will call him tomorrow with the results of conversation with pathology regarding specific diagnosis  I discussed the above with the patient  The patient  voiced understanding and agreement   ______________________________________________________________________    Chief Complaint   Patient presents with    Follow-up       HPI:  Oncology History    No history exists  Interval History:  Clinically stable  ECOG-  1 - Symptomatic but completely ambulatory    Review of Systems   Constitutional: Negative for chills and fever  HENT: Negative for nosebleeds  Eyes: Negative for discharge  Respiratory: Negative for cough and shortness of breath  Cardiovascular: Negative for chest pain  Gastrointestinal: Negative for abdominal pain, constipation and diarrhea  Endocrine: Negative for polydipsia  Genitourinary: Negative for hematuria  Musculoskeletal: Negative for arthralgias  Skin: Negative for color change  Allergic/Immunologic: Negative for immunocompromised state  Neurological: Negative for dizziness and headaches  Hematological: Negative for adenopathy  Psychiatric/Behavioral: Negative for agitation  No past medical history on file    Patient Active Problem List   Diagnosis    Lymphadenopathy     No current outpatient medications on file  No Known Allergies  Past Surgical History:   Procedure Laterality Date    LYMPH NODE BIOPSY Right 6/17/2021    Procedure: EXCISIONAL BIOPSY OF RIGHT NECK LYMPH NODE WITH LYMPHOMA PROTOCOL;  Surgeon: Prabhu Schofield MD;  Location: [facility];  Service: Surgical Oncology    WISDOM TOOTH EXTRACTION       Social History     Objective:  Vitals:    06/23/21 1445   BP: 124/76   BP Location: Left arm   Patient Position: Sitting   Pulse: 103   Resp: 17   Temp: 98 8 °F (37 1 °C)   TempSrc: Tympanic   SpO2: 97%   Weight: 85 kg (187 lb 6 4 oz)   Height: 5' 9" (1 753 m)     Physical Exam  Constitutional:       Appearance: He is well-developed  HENT:      Head: Normocephalic and atraumatic  Eyes:      Pupils: Pupils are equal, round, and reactive to light  Cardiovascular:      Rate and Rhythm: Normal rate and regular rhythm  Heart sounds: No murmur heard  Pulmonary:      Breath sounds: Normal breath sounds  No wheezing or rales  Abdominal:      Palpations: Abdomen is soft  Tenderness: There is no abdominal tenderness  Musculoskeletal:         General: No tenderness  Normal range of motion  Cervical back: Neck supple  Lymphadenopathy:      Cervical: Cervical adenopathy present  Upper Body:      Right upper body: Axillary adenopathy present  Left upper body: Axillary adenopathy present  Comments: Bilateral cervical lymphadenopathy as before  Skin:     Findings: No erythema or rash  Neurological:      Mental Status: He is alert and oriented to person, place, and time  Cranial Nerves: No cranial nerve deficit  Deep Tendon Reflexes: Reflexes are normal and symmetric  Psychiatric:         Behavior: Behavior normal            Labs: I personally reviewed the labs and imaging pertinent to this patient care

## 2021-06-23 NOTE — H&P (VIEW-ONLY)
[facility] [place]  [address] [facility] [id number] [phone]  [address] [facility] Soraya Mchugh [id number]  06/23/21    Discussion:   In summary, this is a 71-year-old male history of lymphadenopathy as previously outlined  A week ago he had biopsy of right cervical node  Flow cytometry was negative  I called the pathology Department  Lymphoma staining protocol is in progress  We anticipate final result tomorrow  We reviewed that T-cell non-Hodgkin's lymphomas or Hodgkin's lymphoma are not ruled out by normal flow cytometry  This is strong evidence against the possibility of a B-cell lymphoma, however  To move forward, we will make arrangements for PET-CT  I will call him tomorrow with the results of conversation with pathology regarding specific diagnosis  I discussed the above with the patient  The patient  voiced understanding and agreement   ______________________________________________________________________    Chief Complaint   Patient presents with    Follow-up       HPI:  Oncology History    No history exists  Interval History:  Clinically stable  ECOG-  1 - Symptomatic but completely ambulatory    Review of Systems   Constitutional: Negative for chills and fever  HENT: Negative for nosebleeds  Eyes: Negative for discharge  Respiratory: Negative for cough and shortness of breath  Cardiovascular: Negative for chest pain  Gastrointestinal: Negative for abdominal pain, constipation and diarrhea  Endocrine: Negative for polydipsia  Genitourinary: Negative for hematuria  Musculoskeletal: Negative for arthralgias  Skin: Negative for color change  Allergic/Immunologic: Negative for immunocompromised state  Neurological: Negative for dizziness and headaches  Hematological: Negative for adenopathy  Psychiatric/Behavioral: Negative for agitation  No past medical history on file    Patient Active Problem List   Diagnosis    Lymphadenopathy     No current outpatient medications on file  No Known Allergies  Past Surgical History:   Procedure Laterality Date    LYMPH NODE BIOPSY Right 6/17/2021    Procedure: EXCISIONAL BIOPSY OF RIGHT NECK LYMPH NODE WITH LYMPHOMA PROTOCOL;  Surgeon: Steven Rinaldi MD;  Location[facility];  Service: Surgical Oncology    WISDOM TOOTH EXTRACTION       Social History     Objective:  Vitals:    06/23/21 1445   BP: 124/76   BP Location: Left arm   Patient Position: Sitting   Pulse: 103   Resp: 17   Temp: 98 8 °F (37 1 °C)   TempSrc: Tympanic   SpO2: 97%   Weight: 85 kg (187 lb 6 4 oz)   Height: 5' 9" (1 753 m)     Physical Exam  Constitutional:       Appearance: He is well-developed  HENT:      Head: Normocephalic and atraumatic  Eyes:      Pupils: Pupils are equal, round, and reactive to light  Cardiovascular:      Rate and Rhythm: Normal rate and regular rhythm  Heart sounds: No murmur heard  Pulmonary:      Breath sounds: Normal breath sounds  No wheezing or rales  Abdominal:      Palpations: Abdomen is soft  Tenderness: There is no abdominal tenderness  Musculoskeletal:         General: No tenderness  Normal range of motion  Cervical back: Neck supple  Lymphadenopathy:      Cervical: Cervical adenopathy present  Upper Body:      Right upper body: Axillary adenopathy present  Left upper body: Axillary adenopathy present  Comments: Bilateral cervical lymphadenopathy as before  Skin:     Findings: No erythema or rash  Neurological:      Mental Status: He is alert and oriented to person, place, and time  Cranial Nerves: No cranial nerve deficit  Deep Tendon Reflexes: Reflexes are normal and symmetric  Psychiatric:         Behavior: Behavior normal            Labs: I personally reviewed the labs and imaging pertinent to this patient care

## 2021-07-01 ENCOUNTER — OFFICE VISIT (OUTPATIENT)
Dept: SURGICAL ONCOLOGY | Facility: CLINIC | Age: 41
End: 2021-07-01

## 2021-07-01 VITALS
SYSTOLIC BLOOD PRESSURE: 124 MMHG | RESPIRATION RATE: 16 BRPM | WEIGHT: 189 LBS | TEMPERATURE: 99.8 F | OXYGEN SATURATION: 96 % | HEART RATE: 78 BPM | DIASTOLIC BLOOD PRESSURE: 82 MMHG | HEIGHT: 69 IN | BODY MASS INDEX: 27.99 KG/M2

## 2021-07-01 DIAGNOSIS — R59.1 LYMPHADENOPATHY: Primary | ICD-10-CM

## 2021-07-01 PROCEDURE — 99024 POSTOP FOLLOW-UP VISIT: CPT | Performed by: STUDENT IN AN ORGANIZED HEALTH CARE EDUCATION/TRAINING PROGRAM

## 2021-07-01 PROCEDURE — 3008F BODY MASS INDEX DOCD: CPT | Performed by: INTERNAL MEDICINE

## 2021-07-01 NOTE — PROGRESS NOTES
Surgical Oncology Consultation    [address] [facility] [address] [facility]  [phone]    Patient:  Dedrick Almanza  1980  [id number]    Primary Care provider:  Aditya Jose, [address] [place] [id number]    Referring provider:  No referring provider defined for this encounter  Diagnoses and all orders for this visit:    Lymphadenopathy        Chief Complaint   Patient presents with    Post-op       No follow-ups on file  Oncology History    No history exists  History of Present Illness  :   Casper Alamo is a 27-year-old male seen here today for lymphadenopathy  Briefly, he states that he received his 2nd dose of the coronavirus vaccine in mid May  He had enlarged lymph nodes on the side of the vaccine, but when this did not resolve after 2 weeks and continued to grow, he sought the opinion of his primary care physician  A CT scan of the neck was obtained, showing bilateral cervical as well as mediastinal and preauricular lymph nodes  He was referred to Medical Oncology, who recommended excisional lymph node biopsy, he was thus referred to me today  He denies any symptomatology including fevers, night sweats, weight loss, tachycardia, diarrhea, nausea or vomiting  This is a 27-year-old gentleman with bilateral palpable cervical, axillary, and inguinal lymphadenopathy  He is asymptomatic, however the findings were concerning for lymphoproliferative disorder  Dr Khushi Bundy saw the patient today and has requested excisional biopsy  Given the increased risk of infection for axillary or inguinal incision, I have recommended right neck node excision  The risks of the procedure include bleeding, infection, seroma, injury to deeper structures  These risks were reviewed in full with the patient and we will proceed as soon as possible  I described that further treatment will be dictated by pathology  He expresses understanding  Interval History 7/1    Patient is status post excisional lymph node biopsy of right cervical node  He is doing well at this time  Has no concerns about his incision  No fevers, chills, nausea, vomiting  No redness, drainage from the incision  Pathology is pending  Review of Systems  Complete ROS Surg Onc:   Constitutional: The patient denies new or recent history of general fatigue, no recent weight loss, no change in appetite  Eyes: No complaints of visual problems, no scleral icterus  ENT: No complaints of ear pain, no hoarseness, no difficulty swallowing,  no tinnitus and no new masses in head, oral cavity, or neck  Cardiovascular: No complaints of chest pain, no palpitations, no ankle edema  Respiratory: No complaints of shortness of breath, no cough  Gastrointestinal: No complaints of jaundice, no bloody stools, no pale stools  Genitourinary: No complaints of dysuria, no hematuria, no nocturia, no frequent urination, no urethral discharge  Musculoskeletal: No complaints of weakness, paralysis, joint stiffness or arthralgias  Integumentary: No complaints of rash, no new lesions  Neurological: No complaints of convulsions, no seizures, no dizziness  Hematologic/Lymphatic: No complaints of easy bruising  Endocrine:  No hot or cold intolerance  No polydipsia, polyphagia, or polyuria  Allergy/immunology:  No environmental allergies  No food allergies  Not immunocompromised  Patient Active Problem List   Diagnosis    Lymphadenopathy     No past medical history on file    Past Surgical History:   Procedure Laterality Date    LYMPH NODE BIOPSY Right 6/17/2021    Procedure: EXCISIONAL BIOPSY OF RIGHT NECK LYMPH NODE WITH LYMPHOMA PROTOCOL;  Surgeon: Radha Davis MD;  [facility];  Service: Surgical Oncology    WISDOM TOOTH EXTRACTION       Family History   Problem Relation Age of Onset    Cancer Maternal Grandfather      Social History     Socioeconomic History    Marital status: Single     Spouse name: Not on file    Number of children: Not on file    Years of education: Not on file    Highest education level: Not on file   Occupational History    Not on file   Tobacco Use    Smoking status: Former Smoker     Types: Cigarettes     Quit date:      Years since quittin 5    Smokeless tobacco: Never Used   Vaping Use    Vaping Use: Some days    Substances: Nicotine (not often)   Substance and Sexual Activity    Alcohol use: Yes     Comment: rare    Drug use: Never    Sexual activity: Not on file   Other Topics Concern    Not on file   Social History Narrative    Not on file     Social Determinants of Health     Financial Resource Strain:     Difficulty of Paying Living Expenses:    Food Insecurity:     Worried About [address] in the Last Year:     [address] in the Last Year:    Transportation Needs:     Lack of Transportation (Medical):  Lack of Transportation (Non-Medical):    Physical Activity:     Days of Exercise per Week:     Minutes of Exercise per Session:    Stress:     Feeling of Stress :    Social Connections:     Frequency of Communication with Friends and Family:     Frequency of Social Gatherings with Friends and Family:     Attends Bahai Services:     Active Member of Clubs or Organizations:     Attends Club or Organization Meetings:     Marital Status:    Intimate Partner Violence:     Fear of Current or Ex-Partner:     Emotionally Abused:     Physically Abused:     Sexually Abused:      No current outpatient medications on file    No Known Allergies    Vitals:    21 1100   BP: 124/82   Pulse: 78   Resp: 16   Temp: 99 8 °F (37 7 °C)   SpO2: 96%       Physical Exam   General: Appears well, appears stated age  Skin: Warm, anicteric  HEENT: Normocephalic, atraumatic; sclera aniceteric, mucous membranes moist; cervical nodes with persistent LAD; well-healed incision  Cardiopulmonary: RRR, Easy WOB, no BLE edema  Abd: Flat and soft, nontender, no masses appreciated, no hepatosplenomegaly  MSK: Symmetric, no cyanosis, no overt weakness  Lymphatic: Significant bulky and mobile cervical, posterior auricular, axillary and inguinal lymphadenopathy  Neuro: Affect appropriate, no gross motor abnormalities    Labs: Reviewed in EPIC    Imaging  No results found  I independently reviewed and interpreted the above laboratory and imaging data  Discussion/Summary:     Patient is status post excisional biopsy of a right cervical node  His incision is healing well  His pathology is pending  He will follow up with Dr Debra Frazier for results of his pathology as well as the plan  I can see him on an as-needed basis  All questions answered today

## 2021-07-06 ENCOUNTER — TELEPHONE (OUTPATIENT)
Dept: HEMATOLOGY ONCOLOGY | Facility: CLINIC | Age: 41
End: 2021-07-06

## 2021-07-06 DIAGNOSIS — C84.48 PERIPHERAL T-CELL LYMPHOMA OF LYMPH NODES OF MULTIPLE REGIONS (HCC): Primary | ICD-10-CM

## 2021-07-06 RX ORDER — PREDNISONE 50 MG/1
TABLET ORAL
Qty: 10 TABLET | Refills: 5 | Status: SHIPPED | OUTPATIENT
Start: 2021-07-06 | End: 2021-10-28 | Stop reason: SDUPTHER

## 2021-07-06 RX ORDER — ALLOPURINOL 300 MG/1
300 TABLET ORAL DAILY
Qty: 30 TABLET | Refills: 1 | Status: SHIPPED | OUTPATIENT
Start: 2021-07-06 | End: 2021-07-29

## 2021-07-06 RX ORDER — ONDANSETRON HYDROCHLORIDE 8 MG/1
8 TABLET, FILM COATED ORAL EVERY 8 HOURS PRN
Qty: 30 TABLET | Refills: 3 | Status: SHIPPED | OUTPATIENT
Start: 2021-07-06

## 2021-07-07 DIAGNOSIS — T45.1X5A CHEMOTHERAPY INDUCED NEUTROPENIA (HCC): ICD-10-CM

## 2021-07-07 DIAGNOSIS — C84.48 PERIPHERAL T-CELL LYMPHOMA OF LYMPH NODES OF MULTIPLE REGIONS (HCC): Primary | ICD-10-CM

## 2021-07-07 DIAGNOSIS — D70.1 CHEMOTHERAPY INDUCED NEUTROPENIA (HCC): ICD-10-CM

## 2021-07-07 RX ORDER — SODIUM CHLORIDE 9 MG/ML
20 INJECTION, SOLUTION INTRAVENOUS ONCE
Status: CANCELLED | OUTPATIENT
Start: 2021-07-19

## 2021-07-07 RX ORDER — DOXORUBICIN HYDROCHLORIDE 2 MG/ML
100 INJECTION, SOLUTION INTRAVENOUS ONCE
Status: CANCELLED | OUTPATIENT
Start: 2021-07-19

## 2021-07-13 ENCOUNTER — HOSPITAL ENCOUNTER (OUTPATIENT)
Dept: INTERVENTIONAL RADIOLOGY/VASCULAR | Facility: HOSPITAL | Age: 41
Discharge: HOME | End: 2021-07-13
Attending: INTERNAL MEDICINE | Admitting: RADIOLOGY
Payer: COMMERCIAL

## 2021-07-13 ENCOUNTER — TELEPHONE (OUTPATIENT)
Dept: HEMATOLOGY ONCOLOGY | Facility: CLINIC | Age: 41
End: 2021-07-13

## 2021-07-13 VITALS
RESPIRATION RATE: 18 BRPM | SYSTOLIC BLOOD PRESSURE: 139 MMHG | TEMPERATURE: 97.8 F | HEART RATE: 78 BPM | OXYGEN SATURATION: 99 % | DIASTOLIC BLOOD PRESSURE: 73 MMHG

## 2021-07-13 DIAGNOSIS — C84.48 PERIPHERAL T-CELL LYMPHOMA OF LYMPH NODES OF MULTIPLE REGIONS (HCC): ICD-10-CM

## 2021-07-13 PROCEDURE — C1788 PORT, INDWELLING, IMP: HCPCS

## 2021-07-13 PROCEDURE — 77001 FLUOROGUIDE FOR VEIN DEVICE: CPT

## 2021-07-13 PROCEDURE — 76937 US GUIDE VASCULAR ACCESS: CPT

## 2021-07-13 PROCEDURE — 36561 INSERT TUNNELED CV CATH: CPT

## 2021-07-13 PROCEDURE — 99152 MOD SED SAME PHYS/QHP 5/>YRS: CPT

## 2021-07-13 PROCEDURE — 99153 MOD SED SAME PHYS/QHP EA: CPT

## 2021-07-13 RX ORDER — SODIUM CHLORIDE 9 MG/ML
75 INJECTION, SOLUTION INTRAVENOUS CONTINUOUS
Status: DISCONTINUED | OUTPATIENT
Start: 2021-07-13 | End: 2021-07-14 | Stop reason: HOSPADM

## 2021-07-13 RX ORDER — FENTANYL CITRATE 50 UG/ML
INJECTION, SOLUTION INTRAMUSCULAR; INTRAVENOUS CODE/TRAUMA/SEDATION MEDICATION
Status: DISCONTINUED | OUTPATIENT
Start: 2021-07-13 | End: 2021-07-14 | Stop reason: HOSPADM

## 2021-07-13 RX ORDER — CEFAZOLIN SODIUM 2 G/50ML
2000 SOLUTION INTRAVENOUS ONCE
Status: COMPLETED | OUTPATIENT
Start: 2021-07-13 | End: 2021-07-13

## 2021-07-13 RX ORDER — MIDAZOLAM HYDROCHLORIDE 2 MG/2ML
INJECTION, SOLUTION INTRAMUSCULAR; INTRAVENOUS CODE/TRAUMA/SEDATION MEDICATION
Status: DISCONTINUED | OUTPATIENT
Start: 2021-07-13 | End: 2021-07-14 | Stop reason: HOSPADM

## 2021-07-13 RX ORDER — CEFAZOLIN SODIUM 2 G/50ML
SOLUTION INTRAVENOUS
Status: DISCONTINUED | OUTPATIENT
Start: 2021-07-13 | End: 2021-07-14 | Stop reason: HOSPADM

## 2021-07-13 RX ADMIN — CEFAZOLIN SODIUM 2000 MG: 2 SOLUTION INTRAVENOUS at 09:54

## 2021-07-13 RX ADMIN — MIDAZOLAM 0.5 MG: 1 INJECTION INTRAMUSCULAR; INTRAVENOUS at 10:31

## 2021-07-13 RX ADMIN — FENTANYL CITRATE 50 MCG: 50 INJECTION, SOLUTION INTRAMUSCULAR; INTRAVENOUS at 10:22

## 2021-07-13 RX ADMIN — FENTANYL CITRATE 25 MCG: 50 INJECTION, SOLUTION INTRAMUSCULAR; INTRAVENOUS at 10:02

## 2021-07-13 RX ADMIN — MIDAZOLAM 0.5 MG: 1 INJECTION INTRAMUSCULAR; INTRAVENOUS at 09:55

## 2021-07-13 RX ADMIN — FENTANYL CITRATE 25 MCG: 50 INJECTION, SOLUTION INTRAMUSCULAR; INTRAVENOUS at 10:17

## 2021-07-13 RX ADMIN — MIDAZOLAM 0.5 MG: 1 INJECTION INTRAMUSCULAR; INTRAVENOUS at 10:17

## 2021-07-13 RX ADMIN — MIDAZOLAM 0.5 MG: 1 INJECTION INTRAMUSCULAR; INTRAVENOUS at 10:02

## 2021-07-13 RX ADMIN — CEFAZOLIN SODIUM 2000 MG: 2 SOLUTION INTRAVENOUS at 10:03

## 2021-07-13 RX ADMIN — FENTANYL CITRATE 25 MCG: 50 INJECTION, SOLUTION INTRAMUSCULAR; INTRAVENOUS at 09:56

## 2021-07-13 RX ADMIN — FENTANYL CITRATE 25 MCG: 50 INJECTION, SOLUTION INTRAMUSCULAR; INTRAVENOUS at 10:31

## 2021-07-13 RX ADMIN — FENTANYL CITRATE 50 MCG: 50 INJECTION, SOLUTION INTRAMUSCULAR; INTRAVENOUS at 09:53

## 2021-07-13 RX ADMIN — MIDAZOLAM 1 MG: 1 INJECTION INTRAMUSCULAR; INTRAVENOUS at 10:22

## 2021-07-13 RX ADMIN — MIDAZOLAM 1 MG: 1 INJECTION INTRAMUSCULAR; INTRAVENOUS at 09:53

## 2021-07-13 NOTE — INTERVAL H&P NOTE
Update: (This section must be completed if the H&P was completed greater than 24 hrs to procedure or admission)    H&P reviewed  After examining the patient, I find no changed to the H&P since it had been written  Patient re-evaluated   Accept as history and physical     Jonna Saucedo MD/July 13, 2021/10:47 AM

## 2021-07-13 NOTE — LETTER
[facility]  [address] [facility] [address]  [phone]  July 13, 2021     Patient: Jeremy Unger   YOB: 1980   Date of Visit: 7/13/2021       To Whom it May Concern:    Jaden Perez is under my professional care  He was seen in the hospital on 7/13/2021  He may return to work on 7/15/2021 with the following limitations -- no strenous activity or lifting of objects greater than 10 pounds until 7/19/2021  If you have any questions or concerns, please don't hesitate to call           Sincerely,          Monster Ross MD

## 2021-07-13 NOTE — TELEPHONE ENCOUNTER
Returned call to Rosario  Advised him we were unable to get him a sooner PET scan  He asked about a different chemo regimen  Pt will send to me via [employer] to discuss with Dr Betsey Lundberg once he is back next week

## 2021-07-13 NOTE — DISCHARGE INSTRUCTIONS
Implanted Venous Access Port     WHAT YOU NEED TO KNOW:   An implanted venous access port is a device used to give treatments and take blood  It may also be called a central venous access device (CVAD)  The port is a small container that is placed under your skin, usually in your upper chest  The port is attached to a catheter that enters a large vein  DISCHARGE INSTRUCTIONS:   Resume your normal diet  Small sips of flat soda will help with mild nausea  Prevent an infection:   · Wash your hands often  Use soap and water  Clean your hands before and after you care for your port  Remind everyone who cares for your port to wash their hands  · Check your skin for infection every day  Look for redness, swelling, or fluid oozing from the port site  Care for your port:   1  You may shower beginning 48 hours after procedure  2  Change dressing if it becomes wet  3  Remove dressing after 24 hours  Leave glue in place  4  It is normal for some bruising to occur  5  Use Tylenol for pain  6  Limit use of arm on the side that your port was placed  Lift nothing heavier than 5 pounds for 1 week, and then gradually increase activity as tolerated  7  DO NOT apply ointment, lotion or cream to port site until incision is healed  Allow glue to fall off  DO NOT attempt to peel glue from skin even it it begins to flake  8, After the port incision is healed you may swim, bathe  Notify the Interventional Radiologist if you have any of the followin  Fever above 101 F    2  Increased redness or swelling after 1st day  3  Increased pain after 1st day  4  Any sign of infection (drainage from port site, skin separation, hot to touch)  5  Persistent nausea or vomiting  Contact Interventional Radiology at [phone] [facility] PATIENTS: Contact Interventional Radiology at [phone]) ([facility]: Contact Interventional Radiology at [phone])

## 2021-07-13 NOTE — BRIEF OP NOTE (RAD/CATH)
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

## 2021-07-15 ENCOUNTER — TELEPHONE (OUTPATIENT)
Dept: HEMATOLOGY ONCOLOGY | Facility: CLINIC | Age: 41
End: 2021-07-15

## 2021-07-15 ENCOUNTER — APPOINTMENT (OUTPATIENT)
Dept: LAB | Facility: HOSPITAL | Age: 41
End: 2021-07-15
Attending: INTERNAL MEDICINE
Payer: COMMERCIAL

## 2021-07-15 DIAGNOSIS — C84.48 PERIPHERAL T-CELL LYMPHOMA OF LYMPH NODES OF MULTIPLE REGIONS (HCC): ICD-10-CM

## 2021-07-15 LAB
ALBUMIN SERPL BCP-MCNC: 3.6 G/DL (ref 3.5–5)
ALP SERPL-CCNC: 122 U/L (ref 46–116)
ALT SERPL W P-5'-P-CCNC: 32 U/L (ref 12–78)
ANION GAP SERPL CALCULATED.3IONS-SCNC: 4 MMOL/L (ref 4–13)
AST SERPL W P-5'-P-CCNC: 13 U/L (ref 5–45)
BASOPHILS # BLD AUTO: 0.15 THOUSANDS/ΜL (ref 0–0.1)
BASOPHILS NFR BLD AUTO: 1 % (ref 0–1)
BILIRUB SERPL-MCNC: 1.24 MG/DL (ref 0.2–1)
BUN SERPL-MCNC: 10 MG/DL (ref 5–25)
CALCIUM SERPL-MCNC: 9.3 MG/DL (ref 8.3–10.1)
CHLORIDE SERPL-SCNC: 105 MMOL/L (ref 100–108)
CO2 SERPL-SCNC: 28 MMOL/L (ref 21–32)
CREAT SERPL-MCNC: 0.82 MG/DL (ref 0.6–1.3)
EOSINOPHIL # BLD AUTO: 0.59 THOUSAND/ΜL (ref 0–0.61)
EOSINOPHIL NFR BLD AUTO: 5 % (ref 0–6)
ERYTHROCYTE [DISTWIDTH] IN BLOOD BY AUTOMATED COUNT: 13.6 % (ref 11.6–15.1)
GFR SERPL CREATININE-BSD FRML MDRD: 111 ML/MIN/1.73SQ M
GLUCOSE P FAST SERPL-MCNC: 91 MG/DL (ref 65–99)
HCT VFR BLD AUTO: 46.4 % (ref 36.5–49.3)
HGB BLD-MCNC: 15.1 G/DL (ref 12–17)
IMM GRANULOCYTES # BLD AUTO: 0.11 THOUSAND/UL (ref 0–0.2)
IMM GRANULOCYTES NFR BLD AUTO: 1 % (ref 0–2)
LDH SERPL-CCNC: 312 U/L (ref 81–234)
LYMPHOCYTES # BLD AUTO: 2.45 THOUSANDS/ΜL (ref 0.6–4.47)
LYMPHOCYTES NFR BLD AUTO: 22 % (ref 14–44)
MCH RBC QN AUTO: 30.7 PG (ref 26.8–34.3)
MCHC RBC AUTO-ENTMCNC: 32.5 G/DL (ref 31.4–37.4)
MCV RBC AUTO: 94 FL (ref 82–98)
MONOCYTES # BLD AUTO: 1.02 THOUSAND/ΜL (ref 0.17–1.22)
MONOCYTES NFR BLD AUTO: 9 % (ref 4–12)
NEUTROPHILS # BLD AUTO: 6.67 THOUSANDS/ΜL (ref 1.85–7.62)
NEUTS SEG NFR BLD AUTO: 62 % (ref 43–75)
NRBC BLD AUTO-RTO: 0 /100 WBCS
PLATELET # BLD AUTO: 431 THOUSANDS/UL (ref 149–390)
PMV BLD AUTO: 8.7 FL (ref 8.9–12.7)
POTASSIUM SERPL-SCNC: 4 MMOL/L (ref 3.5–5.3)
PROT SERPL-MCNC: 7.4 G/DL (ref 6.4–8.2)
RBC # BLD AUTO: 4.92 MILLION/UL (ref 3.88–5.62)
SODIUM SERPL-SCNC: 137 MMOL/L (ref 136–145)
WBC # BLD AUTO: 10.99 THOUSAND/UL (ref 4.31–10.16)

## 2021-07-15 PROCEDURE — 80053 COMPREHEN METABOLIC PANEL: CPT

## 2021-07-15 PROCEDURE — 36415 COLL VENOUS BLD VENIPUNCTURE: CPT

## 2021-07-15 PROCEDURE — 85025 COMPLETE CBC W/AUTO DIFF WBC: CPT

## 2021-07-15 PROCEDURE — 83615 LACTATE (LD) (LDH) ENZYME: CPT

## 2021-07-15 NOTE — TELEPHONE ENCOUNTER
I phoned the patient's mother, Rachana Rea, and left a voicemail message indicating that the [facility] paperwork would unfortunately need to be refaxed, as it is not at the [place] office  I provided the [place] office's fax number and the [employer] number as a call back

## 2021-07-15 NOTE — TELEPHONE ENCOUNTER
Patient mother Christian Currie would like to confirm if a la fax from [address] [place] [address] was received on behalf of Ignacio Ya, patient step father  Dayana Hobsonter states fax was sent to the [place] office 7/13  Best call back [phone]

## 2021-07-16 ENCOUNTER — HOSPITAL ENCOUNTER (OUTPATIENT)
Dept: NON INVASIVE DIAGNOSTICS | Facility: HOSPITAL | Age: 41
Discharge: HOME | End: 2021-07-16
Attending: INTERNAL MEDICINE
Payer: COMMERCIAL

## 2021-07-16 ENCOUNTER — TELEPHONE (OUTPATIENT)
Dept: HEMATOLOGY ONCOLOGY | Facility: CLINIC | Age: 41
End: 2021-07-16

## 2021-07-16 ENCOUNTER — TELEPHONE (OUTPATIENT)
Dept: CARDIAC SURGERY | Facility: CLINIC | Age: 41
End: 2021-07-16

## 2021-07-16 DIAGNOSIS — C84.48 PERIPHERAL T-CELL LYMPHOMA OF LYMPH NODES OF MULTIPLE REGIONS (HCC): ICD-10-CM

## 2021-07-16 PROCEDURE — 93306 TTE W/DOPPLER COMPLETE: CPT

## 2021-07-16 PROCEDURE — 93306 TTE W/DOPPLER COMPLETE: CPT | Performed by: INTERNAL MEDICINE

## 2021-07-16 NOTE — TELEPHONE ENCOUNTER
Confirmed with Dr Samuel Niño to continue treatment on Monday without the Adcetris, as this is not authorized  (CYTOXAN AND ADRIAMYCIN)  Called [facility] to confirm the removal of Adcetris from treatment on 7/19/21  Called and spoke with Neto Berger  Pt is extremely upset about the delay in his treatment  Attempted to explain that we would continue to expedite the authorization of this medication and keep him updated  Pt to have a PET on 7/27/21 which will help to identify staging as needed for the authorization

## 2021-07-16 NOTE — TELEPHONE ENCOUNTER
I reviewed with Dr Dm Nuñez the indications for Adcetris with Adriamycin and Cytoxan as it is overall better survival per the [facility] trial   At this time we will proceed with chemotherapy without th brentuximab and Dr Dm Nuñez will review next week

## 2021-07-16 NOTE — TELEPHONE ENCOUNTER
Manoj Chi stopped by the [place] office to check on the status of his [facility] paperwork   He can be reached at [phone]

## 2021-07-19 ENCOUNTER — TELEPHONE (OUTPATIENT)
Dept: HEMATOLOGY ONCOLOGY | Facility: CLINIC | Age: 41
End: 2021-07-19

## 2021-07-19 ENCOUNTER — HOSPITAL ENCOUNTER (OUTPATIENT)
Dept: INFUSION CENTER | Facility: HOSPITAL | Age: 41
Discharge: HOME | End: 2021-07-19
Attending: INTERNAL MEDICINE
Payer: COMMERCIAL

## 2021-07-19 VITALS
SYSTOLIC BLOOD PRESSURE: 117 MMHG | HEIGHT: 70 IN | BODY MASS INDEX: 26.07 KG/M2 | WEIGHT: 182.1 LBS | RESPIRATION RATE: 18 BRPM | HEART RATE: 91 BPM | DIASTOLIC BLOOD PRESSURE: 77 MMHG | TEMPERATURE: 98 F

## 2021-07-19 DIAGNOSIS — C84.48 PERIPHERAL T-CELL LYMPHOMA OF LYMPH NODES OF MULTIPLE REGIONS (HCC): Primary | ICD-10-CM

## 2021-07-19 PROCEDURE — 96413 CHEMO IV INFUSION 1 HR: CPT

## 2021-07-19 PROCEDURE — 96411 CHEMO IV PUSH ADDL DRUG: CPT

## 2021-07-19 PROCEDURE — 96367 TX/PROPH/DG ADDL SEQ IV INF: CPT

## 2021-07-19 RX ORDER — SODIUM CHLORIDE 9 MG/ML
20 INJECTION, SOLUTION INTRAVENOUS ONCE
Status: COMPLETED | OUTPATIENT
Start: 2021-07-19 | End: 2021-07-19

## 2021-07-19 RX ORDER — DOXORUBICIN HYDROCHLORIDE 2 MG/ML
100 INJECTION, SOLUTION INTRAVENOUS ONCE
Status: COMPLETED | OUTPATIENT
Start: 2021-07-19 | End: 2021-07-19

## 2021-07-19 RX ADMIN — DOXORUBICIN HYDROCHLORIDE 100 MG: 2 INJECTION, SOLUTION INTRAVENOUS at 13:59

## 2021-07-19 RX ADMIN — FOSAPREPITANT 150 MG: 150 INJECTION, POWDER, LYOPHILIZED, FOR SOLUTION INTRAVENOUS at 12:24

## 2021-07-19 RX ADMIN — SODIUM CHLORIDE 20 ML/HR: 0.9 INJECTION, SOLUTION INTRAVENOUS at 11:47

## 2021-07-19 RX ADMIN — CYCLOPHOSPHAMIDE 1500 MG: 1 INJECTION, POWDER, FOR SOLUTION INTRAVENOUS; ORAL at 13:19

## 2021-07-19 RX ADMIN — ONDANSETRON: 2 SOLUTION INTRAMUSCULAR; INTRAVENOUS at 11:47

## 2021-07-19 NOTE — TELEPHONE ENCOUNTER
I phoned the patient's mother this AM and left a voicemail message indicating that we have received the fax from [facility] paperwork for Rosario  The [employer] number was provided

## 2021-07-19 NOTE — TELEPHONE ENCOUNTER
Received Paperwork   What type of form  FMLA   Received by fax or patient drop off  Fax   Emailed to NA   Miscellaneous   Received the FMLA paperwork at the [place] fax

## 2021-07-19 NOTE — PROGRESS NOTES
Chemo completed without incident  Chemo side effects discussed at length with pt and his mother  He will be staying with his parents for a few days to see how he feels after his chemo  He is aware that he has PET on Friday and f/u with dr Mandeep Grant next week  He states he will go to outpatient lab for now for labs prior to treatment  Pt given AVS, return as scheduled

## 2021-07-19 NOTE — PLAN OF CARE
Problem: Potential for Falls  Goal: Patient will remain free of falls  Description: INTERVENTIONS:  - Educate patient/family on patient safety including physical limitations  - Instruct patient to call for assistance with activity   - Consult OT/PT to assist with strengthening/mobility   - Keep Call bell within reach  - Keep bed low and locked with side rails adjusted as appropriate  - Keep care items and personal belongings within reach  - Initiate and maintain comfort rounds  - Make Fall Risk Sign visible to staff  - Apply yellow socks and bracelet for high fall risk patients  - Consider moving patient to room near nurses station  7/19/2021 1152 by Ghada Grimes RN  Outcome: Progressing  7/19/2021 1119 by Ghada Grimes RN  Outcome: Progressing

## 2021-07-20 ENCOUNTER — TELEPHONE (OUTPATIENT)
Dept: HEMATOLOGY ONCOLOGY | Facility: CLINIC | Age: 41
End: 2021-07-20

## 2021-07-20 NOTE — TELEPHONE ENCOUNTER
I talked with the patient by phone today  Clinically he is stable  In  Interestingly, he notes that lymph nodes had decreased noticeably prior to starting steroids before his treatment  Additionally, unfortunately, his insurance had declined Adcetris  After we supplied him with additional information they were agreeable and Adcetris will be arranged as soon as possible  I reviewed the above with the patient  He voiced understanding

## 2021-07-21 ENCOUNTER — TELEPHONE (OUTPATIENT)
Dept: HEMATOLOGY ONCOLOGY | Facility: CLINIC | Age: 41
End: 2021-07-21

## 2021-07-21 DIAGNOSIS — C84.48 PERIPHERAL T-CELL LYMPHOMA OF LYMPH NODES OF MULTIPLE REGIONS (HCC): Primary | ICD-10-CM

## 2021-07-21 NOTE — TELEPHONE ENCOUNTER
Paperwork Complete   What type of form  [place] for [employer] Cassie Cordero)   Sent to [place]  Yes   How did we send to patient  Faxed to [facility], per form requirement   Miscellaneous

## 2021-07-21 NOTE — TELEPHONE ENCOUNTER
Patient mother Shazia Oconnor would like to confirm missing papers for [facility] were received and she could best be reached at [facility]: [phone]

## 2021-07-21 NOTE — TELEPHONE ENCOUNTER
I phoned the patient's mother, Kinjal Guzman, back and informed her that we received the signed form from Faith Cordero and the [employer] [facility] paperwork for Rosario Guzman and the patient expressed understanding

## 2021-07-22 ENCOUNTER — PATIENT OUTREACH (OUTPATIENT)
Dept: HEMATOLOGY ONCOLOGY | Facility: CLINIC | Age: 41
End: 2021-07-22

## 2021-07-22 ENCOUNTER — TELEPHONE (OUTPATIENT)
Dept: HEMATOLOGY ONCOLOGY | Facility: CLINIC | Age: 41
End: 2021-07-22

## 2021-07-22 DIAGNOSIS — R59.1 LYMPHADENOPATHY: Primary | ICD-10-CM

## 2021-07-22 RX ORDER — SODIUM CHLORIDE 9 MG/ML
20 INJECTION, SOLUTION INTRAVENOUS ONCE
Status: CANCELLED | OUTPATIENT
Start: 2021-07-23

## 2021-07-22 NOTE — TELEPHONE ENCOUNTER
Paperwork Complete   What type of form  FMLA form for patient   Sent to [address] [place] [address]   How did we send to patient  Faxed to [facility] 7/22/21   Miscellaneous

## 2021-07-23 ENCOUNTER — HOSPITAL ENCOUNTER (OUTPATIENT)
Dept: INFUSION CENTER | Facility: CLINIC | Age: 41
Discharge: HOME | End: 2021-07-23
Payer: COMMERCIAL

## 2021-07-23 ENCOUNTER — HOSPITAL ENCOUNTER (OUTPATIENT)
Dept: NUCLEAR MEDICINE | Facility: HOSPITAL | Age: 41
Discharge: HOME | End: 2021-07-23
Attending: INTERNAL MEDICINE
Payer: COMMERCIAL

## 2021-07-23 VITALS
HEIGHT: 70 IN | WEIGHT: 181.5 LBS | RESPIRATION RATE: 14 BRPM | SYSTOLIC BLOOD PRESSURE: 126 MMHG | BODY MASS INDEX: 25.98 KG/M2 | HEART RATE: 85 BPM | OXYGEN SATURATION: 98 % | TEMPERATURE: 97.3 F | DIASTOLIC BLOOD PRESSURE: 86 MMHG

## 2021-07-23 DIAGNOSIS — C84.48 PERIPHERAL T-CELL LYMPHOMA OF LYMPH NODES OF MULTIPLE REGIONS (HCC): Primary | ICD-10-CM

## 2021-07-23 DIAGNOSIS — C84.48 PERIPHERAL T-CELL LYMPHOMA OF LYMPH NODES OF MULTIPLE REGIONS (HCC): ICD-10-CM

## 2021-07-23 LAB — GLUCOSE SERPL-MCNC: 69 MG/DL (ref 65–140)

## 2021-07-23 PROCEDURE — 78815 PET IMAGE W/CT SKULL-THIGH: CPT

## 2021-07-23 PROCEDURE — G1004 CDSM NDSC: HCPCS

## 2021-07-23 PROCEDURE — 82948 REAGENT STRIP/BLOOD GLUCOSE: CPT

## 2021-07-23 PROCEDURE — 96413 CHEMO IV INFUSION 1 HR: CPT

## 2021-07-23 PROCEDURE — A9552 F18 FDG: HCPCS

## 2021-07-23 RX ORDER — SODIUM CHLORIDE 9 MG/ML
20 INJECTION, SOLUTION INTRAVENOUS ONCE
Status: COMPLETED | OUTPATIENT
Start: 2021-07-23 | End: 2021-07-23

## 2021-07-23 RX ADMIN — SODIUM CHLORIDE 20 ML/HR: 0.9 INJECTION, SOLUTION INTRAVENOUS at 13:50

## 2021-07-23 RX ADMIN — BRENTUXIMAB VEDOTIN 150 MG: 50 INJECTION, POWDER, LYOPHILIZED, FOR SOLUTION INTRAVENOUS at 14:07

## 2021-07-23 NOTE — PROGRESS NOTES
Patient presents today for treatment with Adcetris  Patient offers no complaints  VSS  No lab parameters ordered  Port accessed without incident with excellent blood return noted

## 2021-07-26 ENCOUNTER — TELEPHONE (OUTPATIENT)
Dept: HEMATOLOGY ONCOLOGY | Facility: CLINIC | Age: 41
End: 2021-07-26

## 2021-07-26 ENCOUNTER — PATIENT OUTREACH (OUTPATIENT)
Dept: HEMATOLOGY ONCOLOGY | Facility: CLINIC | Age: 41
End: 2021-07-26

## 2021-07-26 NOTE — TELEPHONE ENCOUNTER
Verónica from [address] [place], [place] [address]ise [facility] and [facility]  calling to get clarification on [facility] paperwork  Verónica can be reached at [phone]

## 2021-07-26 NOTE — PROGRESS NOTES
MSW received a Distress Thermometer from the infusion center, where the pt self scored a 7 to indicate his level of stress  The pt marked off insurance/financial, treatment decisions, fears, sadness and worry as his psychosocial stressors  He indicated 1 out of 21 physical stressors  MSW made outreach to the pt this day and received his voicemail  MSW left a detailed message, along with a contact number and encouraged the pt to return the call

## 2021-07-28 ENCOUNTER — OFFICE VISIT (OUTPATIENT)
Dept: HEMATOLOGY ONCOLOGY | Facility: CLINIC | Age: 41
End: 2021-07-28
Payer: COMMERCIAL

## 2021-07-28 VITALS
DIASTOLIC BLOOD PRESSURE: 74 MMHG | OXYGEN SATURATION: 97 % | RESPIRATION RATE: 17 BRPM | WEIGHT: 180.6 LBS | SYSTOLIC BLOOD PRESSURE: 130 MMHG | TEMPERATURE: 98.7 F | HEART RATE: 110 BPM | BODY MASS INDEX: 25.86 KG/M2 | HEIGHT: 70 IN

## 2021-07-28 DIAGNOSIS — R21 RASH: ICD-10-CM

## 2021-07-28 DIAGNOSIS — D72.110 IDIOPATHIC HYPEREOSINOPHILIC SYNDROME: ICD-10-CM

## 2021-07-28 DIAGNOSIS — C84.48 PERIPHERAL T-CELL LYMPHOMA OF LYMPH NODES OF MULTIPLE REGIONS (HCC): Primary | ICD-10-CM

## 2021-07-28 PROBLEM — D72.10 EOSINOPHILIA: Status: ACTIVE | Noted: 2021-07-28

## 2021-07-28 PROCEDURE — 3008F BODY MASS INDEX DOCD: CPT | Performed by: INTERNAL MEDICINE

## 2021-07-28 PROCEDURE — 99215 OFFICE O/P EST HI 40 MIN: CPT | Performed by: INTERNAL MEDICINE

## 2021-07-28 PROCEDURE — 1036F TOBACCO NON-USER: CPT | Performed by: INTERNAL MEDICINE

## 2021-07-28 NOTE — PROGRESS NOTES
[facility]  [address] [facility]  [facility] [address]  [place] [id number]  [phone]  [address] [facility] Hipolito Gutierrez [id number]  07/28/21    Discussion:   In summary, this is a 55-year-old male history of peripheral T-cell lymphoma, NOS, at least stage III  He has started chemotherapy with Cytoxan, Adriamycin, Adcetris, prednisone  He seems to have tolerated this very well  He has a rash about the upper chest, upper back in bilateral deltoid regions  This is macular with some scabbing  Possibly acneiform in nature  He also has a rash about the bilateral lower extremities  This had been present prior to diagnosis and dates back to approximately January 2021  It was somewhat pruritic  This seems to be resolving  I suspect this may be paraneoplastic in nature or even metastatic lymphoma  Cervical lymphadenopathy has largely resolved  He had previous eosinophilia, probably reactive  This has resolved  Recent PET-CT shows Resolution of previous cervical lymphadenopathy  Some residual axillary and mediastinal disease is noted without significant hypermetabolism, however  Bilateral pelvic lymphadenopathy also noted with minimal hypermetabolism  For the moment, we will move forward with treatment as outlined  I discussed the above with the patient    The patient  voiced understanding and agreement   ______________________________________________________________________    Chief Complaint   Patient presents with    Follow-up       HPI:  Oncology History   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 ([facility] [address] )   7/6/2021 Initial Diagnosis    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 ([facility] [address] )     7/19/2021 -  Chemotherapy    DOXOrubicin (ADRIAMYCIN), 101 mg, Intravenous, Once, 1 of 6 cycles  Administration: 100 mg (7/19/2021)  cyclophosphamide (CYTOXAN) IVPB, 1,516 mg, Intravenous, Once, 1 of 6 cycles  Administration: 1,500 mg (7/19/2021)  fosaprepitant (EMEND) IVPB, 150 mg, Intravenous, Once, 1 of 6 cycles  Administration: 150 mg (7/19/2021)  brentuximab (ADCETRIS) IVPB, 154 5 mg, Intravenous, Once, 1 of 6 cycles  Administration: 150 mg (7/23/2021)         Interval History:  Clinically stable  ECOG-  1 - Symptomatic but completely ambulatory    Review of Systems   Constitutional: Negative for chills and fever  HENT: Negative for nosebleeds  Eyes: Negative for discharge  Respiratory: Negative for cough and shortness of breath  Cardiovascular: Negative for chest pain  Gastrointestinal: Negative for abdominal pain, constipation and diarrhea  Endocrine: Negative for polydipsia  Genitourinary: Negative for hematuria  Musculoskeletal: Negative for arthralgias  Skin: Negative for color change  Allergic/Immunologic: Negative for immunocompromised state  Neurological: Negative for dizziness and headaches  Hematological: Negative for adenopathy  Psychiatric/Behavioral: Negative for agitation  Past Medical History:   Diagnosis Date    Interstitial keratitis     Lymphoma, peripheral T-cell ([facility] [address] )      Patient Active Problem List   Diagnosis    Lymphadenopathy    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 [facility])       Current Outpatient Medications:     allopurinol (ZYLOPRIM) 300 mg tablet, Take 1 tablet (300 mg total) by mouth daily, Disp: 30 tablet, Rfl: 1    ondansetron (ZOFRAN) 8 mg tablet, Take 1 tablet (8 mg total) by mouth every 8 (eight) hours as needed for nausea or vomiting, Disp: 30 tablet, Rfl: 3    predniSONE 50 mg tablet, Take 2 tabs by mouth each day with breakfast starting the day after chemotherapy  , Disp: 10 tablet, Rfl: 5  No Known Allergies  Past Surgical History:   Procedure Laterality Date    IR PORT PLACEMENT  7/13/2021    LYMPH NODE BIOPSY Right 6/17/2021    Procedure: EXCISIONAL BIOPSY OF RIGHT NECK LYMPH NODE WITH LYMPHOMA PROTOCOL;  Surgeon: Rudi Gomes MD; Location: [facility];  Service: Surgical Oncology    WISDOM TOOTH EXTRACTION       Social History     Objective:  Vitals:    07/28/21 1444   BP: 130/74   BP Location: Left arm   Patient Position: Sitting   Pulse: (!) 110   Resp: 17   Temp: 98 7 °F (37 1 °C)   TempSrc: Tympanic   SpO2: 97%   Weight: 81 9 kg (180 lb 9 6 oz)   Height: 5' 9 6" (1 768 m)     Physical Exam  Constitutional:       Appearance: He is well-developed  HENT:      Head: Normocephalic and atraumatic  Eyes:      Pupils: Pupils are equal, round, and reactive to light  Cardiovascular:      Rate and Rhythm: Normal rate and regular rhythm  Heart sounds: No murmur heard  Pulmonary:      Breath sounds: Normal breath sounds  No wheezing or rales  Abdominal:      Palpations: Abdomen is soft  Tenderness: There is no abdominal tenderness  Musculoskeletal:         General: No tenderness  Normal range of motion  Cervical back: Neck supple  Lymphadenopathy:      Cervical: No cervical adenopathy  Skin:     Findings: No erythema or rash  Neurological:      Mental Status: He is alert and oriented to person, place, and time  Cranial Nerves: No cranial nerve deficit  Deep Tendon Reflexes: Reflexes are normal and symmetric  Psychiatric:         Behavior: Behavior normal            Labs: I personally reviewed the labs and imaging pertinent to this patient care

## 2021-07-29 DIAGNOSIS — C84.48 PERIPHERAL T-CELL LYMPHOMA OF LYMPH NODES OF MULTIPLE REGIONS (HCC): ICD-10-CM

## 2021-07-29 RX ORDER — ALLOPURINOL 300 MG/1
TABLET ORAL
Qty: 30 TABLET | Refills: 1 | Status: SHIPPED | OUTPATIENT
Start: 2021-07-29 | End: 2021-08-30

## 2021-08-02 RX ORDER — SODIUM CHLORIDE 9 MG/ML
20 INJECTION, SOLUTION INTRAVENOUS ONCE
Status: CANCELLED | OUTPATIENT
Start: 2021-08-09

## 2021-08-02 RX ORDER — DOXORUBICIN HYDROCHLORIDE 2 MG/ML
50 INJECTION, SOLUTION INTRAVENOUS ONCE
Status: CANCELLED | OUTPATIENT
Start: 2021-08-09

## 2021-08-04 ENCOUNTER — PATIENT OUTREACH (OUTPATIENT)
Dept: HEMATOLOGY ONCOLOGY | Facility: CLINIC | Age: 41
End: 2021-08-04

## 2021-08-04 NOTE — PROGRESS NOTES
Pt returned call this day and spoke about his main concerns which appear to be financial   He states that he has been using his FMLA and will take a short term disability, if needed but will only receive 60% of his income  He expressed concerns about his mortgage, car payment and household expenses if he were to only receive a portion of his income  MSW reviewed the cancer care funds and acknowledged that while this is only a small one time kayla, it may be able to assist in some way  The pt verbalized that he was seeking something more supplemental ongoing  MSW discussed SSDI and will need to inquire if he could collect both his work and that simultaneously  MSW will gather this information and contact the pt when this information is received  Pt appreciative  MSW provided emotional support and encouraged the pt to call as needed  MSW will continue to follow

## 2021-08-04 NOTE — PROGRESS NOTES
MSW made 2nd attempt to reach pt following a high scoring DT and received his voicemail  MSW left message along with contact information and encouraged the pt to return the call

## 2021-08-05 ENCOUNTER — APPOINTMENT (OUTPATIENT)
Dept: LAB | Facility: HOSPITAL | Age: 41
End: 2021-08-05
Attending: INTERNAL MEDICINE
Payer: COMMERCIAL

## 2021-08-05 DIAGNOSIS — C84.48 PERIPHERAL T-CELL LYMPHOMA OF LYMPH NODES OF MULTIPLE REGIONS (HCC): ICD-10-CM

## 2021-08-05 LAB
ALBUMIN SERPL BCP-MCNC: 3.5 G/DL (ref 3.5–5)
ALP SERPL-CCNC: 96 U/L (ref 46–116)
ALT SERPL W P-5'-P-CCNC: 48 U/L (ref 12–78)
ANION GAP SERPL CALCULATED.3IONS-SCNC: 2 MMOL/L (ref 4–13)
ANISOCYTOSIS BLD QL SMEAR: PRESENT
AST SERPL W P-5'-P-CCNC: 27 U/L (ref 5–45)
BASOPHILS # BLD MANUAL: 0.1 THOUSAND/UL (ref 0–0.1)
BASOPHILS NFR MAR MANUAL: 3 % (ref 0–1)
BILIRUB SERPL-MCNC: 0.38 MG/DL (ref 0.2–1)
BUN SERPL-MCNC: 6 MG/DL (ref 5–25)
CALCIUM SERPL-MCNC: 9.3 MG/DL (ref 8.3–10.1)
CHLORIDE SERPL-SCNC: 107 MMOL/L (ref 100–108)
CO2 SERPL-SCNC: 29 MMOL/L (ref 21–32)
CREAT SERPL-MCNC: 0.66 MG/DL (ref 0.6–1.3)
EOSINOPHIL # BLD MANUAL: 0.03 THOUSAND/UL (ref 0–0.4)
EOSINOPHIL NFR BLD MANUAL: 1 % (ref 0–6)
ERYTHROCYTE [DISTWIDTH] IN BLOOD BY AUTOMATED COUNT: 13.8 % (ref 11.6–15.1)
GFR SERPL CREATININE-BSD FRML MDRD: 122 ML/MIN/1.73SQ M
GIANT PLATELETS BLD QL SMEAR: PRESENT
GLUCOSE SERPL-MCNC: 90 MG/DL (ref 65–140)
HCT VFR BLD AUTO: 41.2 % (ref 36.5–49.3)
HGB BLD-MCNC: 13.9 G/DL (ref 12–17)
LYMPHOCYTES # BLD AUTO: 0.68 THOUSAND/UL (ref 0.6–4.47)
LYMPHOCYTES # BLD AUTO: 20 % (ref 14–44)
MCH RBC QN AUTO: 30.3 PG (ref 26.8–34.3)
MCHC RBC AUTO-ENTMCNC: 33.7 G/DL (ref 31.4–37.4)
MCV RBC AUTO: 90 FL (ref 82–98)
MICROCYTES BLD QL AUTO: PRESENT
MONOCYTES # BLD AUTO: 0.44 THOUSAND/UL (ref 0–1.22)
MONOCYTES NFR BLD: 13 % (ref 4–12)
MYELOCYTES NFR BLD MANUAL: 1 % (ref 0–1)
NEUTROPHILS # BLD MANUAL: 0.81 THOUSAND/UL (ref 1.85–7.62)
NEUTS BAND NFR BLD MANUAL: 9 % (ref 0–8)
NEUTS SEG NFR BLD AUTO: 15 % (ref 43–75)
NRBC BLD AUTO-RTO: 0 /100 WBCS
NRBC BLD AUTO-RTO: 1 /100 WBC (ref 0–2)
PLATELET # BLD AUTO: 414 THOUSANDS/UL (ref 149–390)
PLATELET BLD QL SMEAR: ADEQUATE
PMV BLD AUTO: 8.6 FL (ref 8.9–12.7)
POLYCHROMASIA BLD QL SMEAR: PRESENT
POTASSIUM SERPL-SCNC: 3.9 MMOL/L (ref 3.5–5.3)
PROT SERPL-MCNC: 8 G/DL (ref 6.4–8.2)
RBC # BLD AUTO: 4.59 MILLION/UL (ref 3.88–5.62)
RBC MORPH BLD: PRESENT
SMUDGE CELLS BLD QL SMEAR: PRESENT
SODIUM SERPL-SCNC: 138 MMOL/L (ref 136–145)
VARIANT LYMPHS # BLD AUTO: 38 %
WBC # BLD AUTO: 3.39 THOUSAND/UL (ref 4.31–10.16)

## 2021-08-05 PROCEDURE — 85027 COMPLETE CBC AUTOMATED: CPT

## 2021-08-05 PROCEDURE — 80053 COMPREHEN METABOLIC PANEL: CPT

## 2021-08-05 PROCEDURE — 36415 COLL VENOUS BLD VENIPUNCTURE: CPT

## 2021-08-05 PROCEDURE — 85007 BL SMEAR W/DIFF WBC COUNT: CPT

## 2021-08-06 ENCOUNTER — TELEPHONE (OUTPATIENT)
Dept: HEMATOLOGY ONCOLOGY | Facility: CLINIC | Age: 41
End: 2021-08-06

## 2021-08-06 ENCOUNTER — PATIENT OUTREACH (OUTPATIENT)
Dept: HEMATOLOGY ONCOLOGY | Facility: CLINIC | Age: 41
End: 2021-08-06

## 2021-08-06 DIAGNOSIS — C84.48 PERIPHERAL T-CELL LYMPHOMA OF LYMPH NODES OF MULTIPLE REGIONS (HCC): Primary | ICD-10-CM

## 2021-08-06 PROBLEM — T45.1X5A CHEMOTHERAPY INDUCED NEUTROPENIA (HCC): Status: ACTIVE | Noted: 2021-08-06

## 2021-08-06 PROBLEM — D70.1 CHEMOTHERAPY INDUCED NEUTROPENIA (HCC): Status: ACTIVE | Noted: 2021-08-06

## 2021-08-06 NOTE — TELEPHONE ENCOUNTER
Pt to rcv neulasta after his treatment on Monday  Called and discussed this with pt  Awaiting auth from oncology finance for either onpro on neulasta injection

## 2021-08-06 NOTE — TELEPHONE ENCOUNTER
Left VM for [facility], to alert them of the addition neulasta to treatment plan  Left VM stating that [facility] is authorizing the addition of this drug

## 2021-08-06 NOTE — PROGRESS NOTES
MSW made outreach to pt this day and provided him information on SSDI  As per the Social [employer] rep, the pt will be eligible to receive his short term disability and disability through social security at the same time  The pt was relieved to learn this  MSW encouraged the pt to begin this application today, explaining the process can take some time  MSW also contacted the Lymphoma society and while they have no significant funding at this time, the pt is eligible for two different one time grants in the amount of $100  Pt would have to call at [phone] and speak with a representative  MSW provided this information to the pt and he was appreciative  Emotional support offered and will be ongoing

## 2021-08-06 NOTE — TELEPHONE ENCOUNTER
Received a call from patient  He is calling about his low ANC from yesterday = 0 81  Patient is due for next treatment on Monday 8/9/21  Will send to RN to review with Dr Yelena Hunt  Patient requesting a call back to confirm he is ok for treatment Monday - are there any changes that need to be made?     Reviewed neutropenic precautions  Call back number for patient [phone]

## 2021-08-06 NOTE — TELEPHONE ENCOUNTER
Can you please ck with Finance Team   Pt needs neulasta today for tx @ 1200  Not sure if he can have on pro? I already called pt to say he was getting his treatment  If he can not get on pro, please add another day and let the infusion room  Know    Gretel Verma

## 2021-08-09 ENCOUNTER — HOSPITAL ENCOUNTER (OUTPATIENT)
Dept: INFUSION CENTER | Facility: CLINIC | Age: 41
Discharge: HOME | End: 2021-08-09
Payer: COMMERCIAL

## 2021-08-09 VITALS
HEIGHT: 70 IN | RESPIRATION RATE: 18 BRPM | OXYGEN SATURATION: 97 % | DIASTOLIC BLOOD PRESSURE: 70 MMHG | HEART RATE: 82 BPM | SYSTOLIC BLOOD PRESSURE: 117 MMHG | BODY MASS INDEX: 25.94 KG/M2 | WEIGHT: 181.22 LBS | TEMPERATURE: 97.7 F

## 2021-08-09 DIAGNOSIS — C84.48 PERIPHERAL T-CELL LYMPHOMA OF LYMPH NODES OF MULTIPLE REGIONS (HCC): ICD-10-CM

## 2021-08-09 DIAGNOSIS — T45.1X5A CHEMOTHERAPY INDUCED NEUTROPENIA (HCC): Primary | ICD-10-CM

## 2021-08-09 DIAGNOSIS — D70.1 CHEMOTHERAPY INDUCED NEUTROPENIA (HCC): Primary | ICD-10-CM

## 2021-08-09 PROCEDURE — 96377 APPLICATON ON-BODY INJECTOR: CPT

## 2021-08-09 PROCEDURE — 96367 TX/PROPH/DG ADDL SEQ IV INF: CPT

## 2021-08-09 PROCEDURE — 96413 CHEMO IV INFUSION 1 HR: CPT

## 2021-08-09 PROCEDURE — 96411 CHEMO IV PUSH ADDL DRUG: CPT

## 2021-08-09 PROCEDURE — 96417 CHEMO IV INFUS EACH ADDL SEQ: CPT

## 2021-08-09 RX ORDER — DOXORUBICIN HYDROCHLORIDE 2 MG/ML
50 INJECTION, SOLUTION INTRAVENOUS ONCE
Status: COMPLETED | OUTPATIENT
Start: 2021-08-09 | End: 2021-08-09

## 2021-08-09 RX ORDER — SODIUM CHLORIDE 9 MG/ML
20 INJECTION, SOLUTION INTRAVENOUS ONCE
Status: COMPLETED | OUTPATIENT
Start: 2021-08-09 | End: 2021-08-09

## 2021-08-09 RX ADMIN — SODIUM CHLORIDE 20 ML/HR: 0.9 INJECTION, SOLUTION INTRAVENOUS at 12:36

## 2021-08-09 RX ADMIN — PEGFILGRASTIM 6 MG: KIT SUBCUTANEOUS at 15:46

## 2021-08-09 RX ADMIN — BRENTUXIMAB VEDOTIN 150 MG: 50 INJECTION, POWDER, LYOPHILIZED, FOR SOLUTION INTRAVENOUS at 14:11

## 2021-08-09 RX ADMIN — FOSAPREPITANT 150 MG: 150 INJECTION, POWDER, LYOPHILIZED, FOR SOLUTION INTRAVENOUS at 13:02

## 2021-08-09 RX ADMIN — CYCLOPHOSPHAMIDE 1500 MG: 1 INJECTION, POWDER, FOR SOLUTION INTRAVENOUS; ORAL at 14:56

## 2021-08-09 RX ADMIN — DEXAMETHASONE SODIUM PHOSPHATE: 10 INJECTION, SOLUTION INTRAMUSCULAR; INTRAVENOUS at 12:38

## 2021-08-09 RX ADMIN — DOXORUBICIN HYDROCHLORIDE 101 MG: 2 INJECTION, SOLUTION INTRAVENOUS at 15:28

## 2021-08-09 NOTE — PROGRESS NOTES
Pt resting with no complaints, vitals stable, labs within parameters for treatment, call bell within reach  All questions answered and emotional support given  Will continue to monitor

## 2021-08-09 NOTE — PROGRESS NOTES
Pt tolerated treatment well  Neulasta on pro education completed and both pt and his mother verbalized understanding  Aware of next appt  AVS declined  Pt has Juliannat

## 2021-08-10 ENCOUNTER — APPOINTMENT (OUTPATIENT)
Dept: LAB | Facility: CLINIC | Age: 41
End: 2021-08-10
Payer: COMMERCIAL

## 2021-08-10 ENCOUNTER — TELEPHONE (OUTPATIENT)
Dept: HEMATOLOGY ONCOLOGY | Facility: CLINIC | Age: 41
End: 2021-08-10

## 2021-08-10 DIAGNOSIS — R30.0 DYSURIA: Primary | ICD-10-CM

## 2021-08-10 LAB
BILIRUB UR QL STRIP: NEGATIVE
CLARITY UR: CLEAR
COLOR UR: NORMAL
GLUCOSE UR STRIP-MCNC: NEGATIVE MG/DL
HGB UR QL STRIP.AUTO: NEGATIVE
KETONES UR STRIP-MCNC: NEGATIVE MG/DL
LEUKOCYTE ESTERASE UR QL STRIP: NEGATIVE
NITRITE UR QL STRIP: NEGATIVE
PH UR STRIP.AUTO: 6.5 [PH]
PROT UR STRIP-MCNC: NEGATIVE MG/DL
SP GR UR STRIP.AUTO: <=1.005 (ref 1–1.03)
UROBILINOGEN UR QL STRIP.AUTO: 0.2 E.U./DL

## 2021-08-10 PROCEDURE — 81003 URINALYSIS AUTO W/O SCOPE: CPT

## 2021-08-10 NOTE — TELEPHONE ENCOUNTER
Patient is calling that he changed his mind about taking a continuous leave from work  Please disregard disability paper work that was recently sent to you for continuous leave  Will forward to Dr Tim Ball MA  RN-FYI

## 2021-08-10 NOTE — TELEPHONE ENCOUNTER
Left VM for pt reiterating what Amanda told him  Push fluids and to call back tomorrow if symptoms don't improve or get worse

## 2021-08-10 NOTE — TELEPHONE ENCOUNTER
Called and spoke with Rosario  Pt does not want medication for his hiccups  Reviewed symptoms with Dr Ora Rollins  Pt to obtain a UA  Called and reviewed with Rosario  He will be going to the [facility]

## 2021-08-10 NOTE — TELEPHONE ENCOUNTER
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

## 2021-08-16 ENCOUNTER — OFFICE VISIT (OUTPATIENT)
Dept: HEMATOLOGY ONCOLOGY | Facility: CLINIC | Age: 41
End: 2021-08-16
Payer: COMMERCIAL

## 2021-08-16 VITALS
HEART RATE: 82 BPM | SYSTOLIC BLOOD PRESSURE: 128 MMHG | TEMPERATURE: 98.4 F | DIASTOLIC BLOOD PRESSURE: 72 MMHG | OXYGEN SATURATION: 98 % | HEIGHT: 70 IN | WEIGHT: 182.8 LBS | RESPIRATION RATE: 18 BRPM | BODY MASS INDEX: 26.17 KG/M2

## 2021-08-16 DIAGNOSIS — T45.1X5A CHEMOTHERAPY INDUCED NEUTROPENIA (HCC): ICD-10-CM

## 2021-08-16 DIAGNOSIS — R59.1 LYMPHADENOPATHY: ICD-10-CM

## 2021-08-16 DIAGNOSIS — D70.1 CHEMOTHERAPY INDUCED NEUTROPENIA (HCC): ICD-10-CM

## 2021-08-16 DIAGNOSIS — C84.48 PERIPHERAL T-CELL LYMPHOMA OF LYMPH NODES OF MULTIPLE REGIONS (HCC): Primary | ICD-10-CM

## 2021-08-16 DIAGNOSIS — D75.839 THROMBOCYTOSIS: ICD-10-CM

## 2021-08-16 PROCEDURE — 3008F BODY MASS INDEX DOCD: CPT | Performed by: INTERNAL MEDICINE

## 2021-08-16 PROCEDURE — 1036F TOBACCO NON-USER: CPT | Performed by: INTERNAL MEDICINE

## 2021-08-16 PROCEDURE — 99215 OFFICE O/P EST HI 40 MIN: CPT | Performed by: INTERNAL MEDICINE

## 2021-08-16 NOTE — PROGRESS NOTES
[facility] [place]  [address] [address]  [phone]  [address] [facility] Kash[address] Ambrosio [id number]  09/15/21    Discussion:   In summary, this is a 26-year-old male history of peripheral T-cell lymphoma as outlined  He is generally doing well  He had some diarrhea after his 2nd treatment  This responded well to p r n  Imodium  Nausea is easily controlled with occasional Zofran  He continues to work although he does take time off when he needs it for fatigue  This fluctuates  Leukopenia attributable to chemotherapy  Asymptomatic  Observation favored  Atypical lymphocytes are noted  Significance is unclear  Observation for the moment  Thrombocytosis, likely reactive, mild  Observation  His baseline PET-CT showed lymphadenopathy but no significant hypermetabolism  I suspect this may have been due to a combination of steroid therapy prior to that as well as waning effect of recent COVID vaccination  In any event, repeat PET-CT is arranged after his 3rd cycle  If this shows low Deauville score, consolidative chemotherapy would follow  I think it would be reasonable to obtain transplant consultation after 6th cycle  I discussed the above with the patient  The patient  voiced understanding and agreement   ______________________________________________________________________    Chief Complaint   Patient presents with    Follow-up       HPI:  Oncology History   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 ([facility] [address] )   5/28/2021 Initial Diagnosis    Patient had COVID vaccination in early April, 2nd injection in early May  A week or 2 later he developed substantial bilateral cervical adenopathy  May 28, 2021 CT of the neck showed diffuse bilateral cervical adenopathy measuring up to 2 5 cm on the right       6/17/2021 Biopsy    Right neck lymph node, Biopsy:     - Peripheral T-cell lymphoma, not otherwise specified, see note      7/19/2021 -  Chemotherapy    DOXOrubicin (ADRIAMYCIN), 101 mg, Intravenous, Once, 3 of 6 cycles  Administration: 100 mg (7/19/2021), 101 mg (8/9/2021), 101 mg (8/30/2021)  pegfilgrastim (Daniel Curls), 6 mg, Subcutaneous, Once, 2 of 5 cycles  Administration: 6 mg (8/9/2021), 6 mg (8/30/2021)  cyclophosphamide (CYTOXAN) IVPB, 1,516 mg, Intravenous, Once, 3 of 6 cycles  Administration: 1,500 mg (7/19/2021), 1,500 mg (8/9/2021), 1,500 mg (8/30/2021)  fosaprepitant (EMEND) IVPB, 150 mg, Intravenous, Once, 3 of 6 cycles  Administration: 150 mg (7/19/2021), 150 mg (8/9/2021), 150 mg (8/30/2021)  brentuximab (ADCETRIS) IVPB, 154 5 mg, Intravenous, Once, 3 of 6 cycles  Administration: 150 mg (8/9/2021), 150 mg (8/30/2021), 150 mg (7/23/2021)     7/23/2021 -  Cancer Staged    July 23, 2021 PET-CT showed marked decrease in the size of cervical and mediastinal lymph nodes compared to prior CT scan of May 28th 2021  Abdominal pelvic lymph nodes measure para aortic nodes up to 1 5 cm  Inguinal nodes up to 2 4 cm and a lucent defect with sclerotic margins in the left femoral neck measuring 1 8 cm  Although these lesions showed SUV of 2 1 max  Interval History:  Clinically stable  ECOG-  1 - Symptomatic but completely ambulatory    Review of Systems   Constitutional: Negative for chills and fever  HENT: Negative for nosebleeds  Eyes: Negative for discharge  Respiratory: Negative for cough and shortness of breath  Cardiovascular: Negative for chest pain  Gastrointestinal: Negative for abdominal pain, constipation and diarrhea  Endocrine: Negative for polydipsia  Genitourinary: Negative for hematuria  Musculoskeletal: Negative for arthralgias  Skin: Negative for color change  Allergic/Immunologic: Negative for immunocompromised state  Neurological: Negative for dizziness and headaches  Hematological: Negative for adenopathy  Psychiatric/Behavioral: Negative for agitation  Past Medical History:   Diagnosis Date    Interstitial keratitis     Lymphoma, peripheral T-cell ([facility] [address] )      Patient Active Problem List   Diagnosis    Lymphadenopathy    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 (HCC)    Rash    Eosinophilia    Chemotherapy induced neutropenia (HCC)    Thrombocytosis (HCC)       Current Outpatient Medications:     ondansetron (ZOFRAN) 8 mg tablet, Take 1 tablet (8 mg total) by mouth every 8 (eight) hours as needed for nausea or vomiting, Disp: 30 tablet, Rfl: 3    predniSONE 50 mg tablet, Take 2 tabs by mouth each day with breakfast starting the day after chemotherapy  , Disp: 10 tablet, Rfl: 5    allopurinol (ZYLOPRIM) 300 mg tablet, TAKE 1 TABLET BY MOUTH EVERY DAY, Disp: 30 tablet, Rfl: 1  No Known Allergies  Past Surgical History:   Procedure Laterality Date    IR PORT PLACEMENT  7/13/2021    LYMPH NODE BIOPSY Right 6/17/2021    Procedure: EXCISIONAL BIOPSY OF RIGHT NECK LYMPH NODE WITH LYMPHOMA PROTOCOL;  Surgeon: Evgeny Tobar MD;  [facility]: [facility];  Service: Surgical Oncology    WISDOM TOOTH EXTRACTION       Social History     Objective:  Vitals:    08/16/21 0823   BP: 128/72   BP Location: Left arm   Patient Position: Sitting   Pulse: 82   Resp: 18   Temp: 98 4 °F (36 9 °C)   TempSrc: Tympanic   SpO2: 98%   Weight: 82 9 kg (182 lb 12 8 oz)   Height: 5' 9 5" (1 765 m)     Physical Exam  Constitutional:       Appearance: He is well-developed  HENT:      Head: Normocephalic and atraumatic  Eyes:      Pupils: Pupils are equal, round, and reactive to light  Cardiovascular:      Rate and Rhythm: Normal rate and regular rhythm  Heart sounds: No murmur heard  Pulmonary:      Breath sounds: Normal breath sounds  No wheezing or rales  Abdominal:      Palpations: Abdomen is soft  Tenderness: There is no abdominal tenderness  Musculoskeletal:         General: No tenderness  Normal range of motion        Cervical back: Neck supple  Lymphadenopathy:      Cervical: No cervical adenopathy  Skin:     Findings: No erythema or rash  Neurological:      Mental Status: He is alert and oriented to person, place, and time  Cranial Nerves: No cranial nerve deficit  Deep Tendon Reflexes: Reflexes are normal and symmetric  Psychiatric:         Behavior: Behavior normal            Labs: I personally reviewed the labs and imaging pertinent to this patient care

## 2021-08-24 ENCOUNTER — PATIENT OUTREACH (OUTPATIENT)
Dept: HEMATOLOGY ONCOLOGY | Facility: CLINIC | Age: 41
End: 2021-08-24

## 2021-08-24 NOTE — PROGRESS NOTES
Pt called this day asking about further information with regards to the cancer funds  During the conversation, it appeared as though the pt's main concern was his outstanding hospital bill  MSUMA reviewed the [facility] and the pt shared that he was interested in applying for this  MSW e-mailed the pt this application  MSW also explained that the pt is eligible for the cancer funds as well  Pt states his parents have been helping him as he is struggling financially  He will review his bills and determine how the funds can best help him  Emotional support offered

## 2021-08-25 DIAGNOSIS — C84.48 PERIPHERAL T-CELL LYMPHOMA OF LYMPH NODES OF MULTIPLE REGIONS (HCC): ICD-10-CM

## 2021-08-26 RX ORDER — DOXORUBICIN HYDROCHLORIDE 2 MG/ML
50 INJECTION, SOLUTION INTRAVENOUS ONCE
Status: CANCELLED | OUTPATIENT
Start: 2021-08-30

## 2021-08-26 RX ORDER — SODIUM CHLORIDE 9 MG/ML
20 INJECTION, SOLUTION INTRAVENOUS ONCE
Status: CANCELLED | OUTPATIENT
Start: 2021-08-30

## 2021-08-27 ENCOUNTER — APPOINTMENT (OUTPATIENT)
Dept: LAB | Facility: HOSPITAL | Age: 41
End: 2021-08-27
Attending: INTERNAL MEDICINE
Payer: COMMERCIAL

## 2021-08-27 DIAGNOSIS — D70.1 CHEMOTHERAPY INDUCED NEUTROPENIA (HCC): ICD-10-CM

## 2021-08-27 DIAGNOSIS — C84.48 PERIPHERAL T-CELL LYMPHOMA OF LYMPH NODES OF MULTIPLE REGIONS (HCC): ICD-10-CM

## 2021-08-27 DIAGNOSIS — T45.1X5A CHEMOTHERAPY INDUCED NEUTROPENIA (HCC): ICD-10-CM

## 2021-08-27 LAB
ALBUMIN SERPL BCP-MCNC: 3.6 G/DL (ref 3.5–5)
ALP SERPL-CCNC: 114 U/L (ref 46–116)
ALT SERPL W P-5'-P-CCNC: 45 U/L (ref 12–78)
ANION GAP SERPL CALCULATED.3IONS-SCNC: 6 MMOL/L (ref 4–13)
ANISOCYTOSIS BLD QL SMEAR: PRESENT
ARTIFACT: PRESENT
AST SERPL W P-5'-P-CCNC: 22 U/L (ref 5–45)
BASOPHILS # BLD MANUAL: 0.11 THOUSAND/UL (ref 0–0.1)
BASOPHILS NFR MAR MANUAL: 1 % (ref 0–1)
BILIRUB SERPL-MCNC: 0.53 MG/DL (ref 0.2–1)
BUN SERPL-MCNC: 6 MG/DL (ref 5–25)
CALCIUM SERPL-MCNC: 9.2 MG/DL (ref 8.3–10.1)
CHLORIDE SERPL-SCNC: 106 MMOL/L (ref 100–108)
CO2 SERPL-SCNC: 26 MMOL/L (ref 21–32)
CREAT SERPL-MCNC: 0.89 MG/DL (ref 0.6–1.3)
EOSINOPHIL # BLD MANUAL: 0 THOUSAND/UL (ref 0–0.4)
EOSINOPHIL NFR BLD MANUAL: 0 % (ref 0–6)
ERYTHROCYTE [DISTWIDTH] IN BLOOD BY AUTOMATED COUNT: 16 % (ref 11.6–15.1)
GFR SERPL CREATININE-BSD FRML MDRD: 108 ML/MIN/1.73SQ M
GLUCOSE SERPL-MCNC: 88 MG/DL (ref 65–140)
HCT VFR BLD AUTO: 37.5 % (ref 36.5–49.3)
HGB BLD-MCNC: 12.8 G/DL (ref 12–17)
LYMPHOCYTES # BLD AUTO: 0.78 THOUSAND/UL (ref 0.6–4.47)
LYMPHOCYTES # BLD AUTO: 7 % (ref 14–44)
MCH RBC QN AUTO: 30.7 PG (ref 26.8–34.3)
MCHC RBC AUTO-ENTMCNC: 34.1 G/DL (ref 31.4–37.4)
MCV RBC AUTO: 90 FL (ref 82–98)
METAMYELOCYTES NFR BLD MANUAL: 2 % (ref 0–1)
MONOCYTES # BLD AUTO: 0.56 THOUSAND/UL (ref 0–1.22)
MONOCYTES NFR BLD: 5 % (ref 4–12)
MYELOCYTES NFR BLD MANUAL: 1 % (ref 0–1)
NEUTROPHILS # BLD MANUAL: 9.4 THOUSAND/UL (ref 1.85–7.62)
NEUTS BAND NFR BLD MANUAL: 5 % (ref 0–8)
NEUTS SEG NFR BLD AUTO: 79 % (ref 43–75)
PLATELET # BLD AUTO: 294 THOUSANDS/UL (ref 149–390)
PLATELET BLD QL SMEAR: ADEQUATE
PMV BLD AUTO: 9.2 FL (ref 8.9–12.7)
POLYCHROMASIA BLD QL SMEAR: PRESENT
POTASSIUM SERPL-SCNC: 3.9 MMOL/L (ref 3.5–5.3)
PROT SERPL-MCNC: 7.1 G/DL (ref 6.4–8.2)
RBC # BLD AUTO: 4.17 MILLION/UL (ref 3.88–5.62)
RBC MORPH BLD: PRESENT
SODIUM SERPL-SCNC: 138 MMOL/L (ref 136–145)
WBC # BLD AUTO: 11.19 THOUSAND/UL (ref 4.31–10.16)

## 2021-08-27 PROCEDURE — 85007 BL SMEAR W/DIFF WBC COUNT: CPT

## 2021-08-27 PROCEDURE — 36415 COLL VENOUS BLD VENIPUNCTURE: CPT

## 2021-08-27 PROCEDURE — 80053 COMPREHEN METABOLIC PANEL: CPT

## 2021-08-27 PROCEDURE — 85027 COMPLETE CBC AUTOMATED: CPT

## 2021-08-30 ENCOUNTER — HOSPITAL ENCOUNTER (OUTPATIENT)
Dept: INFUSION CENTER | Facility: CLINIC | Age: 41
Discharge: HOME | End: 2021-08-30
Payer: COMMERCIAL

## 2021-08-30 VITALS
HEART RATE: 79 BPM | HEIGHT: 69 IN | DIASTOLIC BLOOD PRESSURE: 70 MMHG | OXYGEN SATURATION: 99 % | BODY MASS INDEX: 27.25 KG/M2 | TEMPERATURE: 97.2 F | SYSTOLIC BLOOD PRESSURE: 121 MMHG | WEIGHT: 184 LBS | RESPIRATION RATE: 18 BRPM

## 2021-08-30 DIAGNOSIS — T45.1X5A CHEMOTHERAPY INDUCED NEUTROPENIA (HCC): Primary | ICD-10-CM

## 2021-08-30 DIAGNOSIS — C84.48 PERIPHERAL T-CELL LYMPHOMA OF LYMPH NODES OF MULTIPLE REGIONS (HCC): ICD-10-CM

## 2021-08-30 DIAGNOSIS — D70.1 CHEMOTHERAPY INDUCED NEUTROPENIA (HCC): Primary | ICD-10-CM

## 2021-08-30 PROCEDURE — 96377 APPLICATON ON-BODY INJECTOR: CPT

## 2021-08-30 PROCEDURE — 96367 TX/PROPH/DG ADDL SEQ IV INF: CPT

## 2021-08-30 PROCEDURE — 96417 CHEMO IV INFUS EACH ADDL SEQ: CPT

## 2021-08-30 PROCEDURE — 96413 CHEMO IV INFUSION 1 HR: CPT

## 2021-08-30 PROCEDURE — 96411 CHEMO IV PUSH ADDL DRUG: CPT

## 2021-08-30 RX ORDER — DOXORUBICIN HYDROCHLORIDE 2 MG/ML
50 INJECTION, SOLUTION INTRAVENOUS ONCE
Status: COMPLETED | OUTPATIENT
Start: 2021-08-30 | End: 2021-08-30

## 2021-08-30 RX ORDER — ALLOPURINOL 300 MG/1
TABLET ORAL
Qty: 30 TABLET | Refills: 1 | Status: SHIPPED | OUTPATIENT
Start: 2021-08-30 | End: 2021-09-28

## 2021-08-30 RX ORDER — SODIUM CHLORIDE 9 MG/ML
20 INJECTION, SOLUTION INTRAVENOUS ONCE
Status: COMPLETED | OUTPATIENT
Start: 2021-08-30 | End: 2021-08-30

## 2021-08-30 RX ADMIN — SODIUM CHLORIDE 20 ML/HR: 0.9 INJECTION, SOLUTION INTRAVENOUS at 11:35

## 2021-08-30 RX ADMIN — DOXORUBICIN HYDROCHLORIDE 101 MG: 2 INJECTION, SOLUTION INTRAVENOUS at 14:10

## 2021-08-30 RX ADMIN — DEXAMETHASONE SODIUM PHOSPHATE: 10 INJECTION, SOLUTION INTRAMUSCULAR; INTRAVENOUS at 11:50

## 2021-08-30 RX ADMIN — FOSAPREPITANT 150 MG: 150 INJECTION, POWDER, LYOPHILIZED, FOR SOLUTION INTRAVENOUS at 12:20

## 2021-08-30 RX ADMIN — CYCLOPHOSPHAMIDE 1500 MG: 1 INJECTION, POWDER, FOR SOLUTION INTRAVENOUS; ORAL at 13:35

## 2021-08-30 RX ADMIN — BRENTUXIMAB VEDOTIN 150 MG: 50 INJECTION, POWDER, LYOPHILIZED, FOR SOLUTION INTRAVENOUS at 13:00

## 2021-08-30 RX ADMIN — PEGFILGRASTIM 6 MG: KIT SUBCUTANEOUS at 14:30

## 2021-08-30 NOTE — PROGRESS NOTES
Tolerated infusion without incident: No adverse reactions noted: Neulasta On Pro per MD order: Applied to Left UA: Verified follow up appt with patient: AVS given per request

## 2021-08-30 NOTE — PROGRESS NOTES
Patient to [facility] for Adcetris / Cytoxan / Adriamycin: Offers no complaints at present time: Lab work ( 08/27/21 ) reviewed:  Within parameters to treat: Right PAC accessed without difficulty: Good blood return noted

## 2021-09-01 DIAGNOSIS — U07.1 COVID-19: Primary | ICD-10-CM

## 2021-09-01 DIAGNOSIS — U07.1 COVID-19 VIREMIA: ICD-10-CM

## 2021-09-01 NOTE — PROGRESS NOTES
Rec'd call from pt asking if he could be tested for covid antibodies  Pt also asking if its ok for him to take fish oil, tumeric, and activia yogurt  Advised him all of the above was ok per Dr Eunice Garcia

## 2021-09-03 ENCOUNTER — APPOINTMENT (OUTPATIENT)
Dept: LAB | Facility: HOSPITAL | Age: 41
End: 2021-09-03
Attending: INTERNAL MEDICINE
Payer: COMMERCIAL

## 2021-09-03 DIAGNOSIS — U07.1 COVID-19 VIREMIA: ICD-10-CM

## 2021-09-03 PROCEDURE — 86769 SARS-COV-2 COVID-19 ANTIBODY: CPT

## 2021-09-03 PROCEDURE — 36415 COLL VENOUS BLD VENIPUNCTURE: CPT

## 2021-09-04 LAB
SARS-COV-2 IGG SERPL QL IA: REACTIVE
SARS-COV-2 IGG+IGM SERPL QL IA: REACTIVE

## 2021-09-13 RX ORDER — DOXORUBICIN HYDROCHLORIDE 2 MG/ML
50 INJECTION, SOLUTION INTRAVENOUS ONCE
Status: CANCELLED | OUTPATIENT
Start: 2021-09-20

## 2021-09-13 RX ORDER — SODIUM CHLORIDE 9 MG/ML
20 INJECTION, SOLUTION INTRAVENOUS ONCE
Status: CANCELLED | OUTPATIENT
Start: 2021-09-20

## 2021-09-15 ENCOUNTER — HOSPITAL ENCOUNTER (OUTPATIENT)
Dept: NUCLEAR MEDICINE | Facility: HOSPITAL | Age: 41
Discharge: HOME | End: 2021-09-15
Attending: INTERNAL MEDICINE
Payer: COMMERCIAL

## 2021-09-15 DIAGNOSIS — C84.48 PERIPHERAL T-CELL LYMPHOMA OF LYMPH NODES OF MULTIPLE REGIONS (HCC): ICD-10-CM

## 2021-09-15 LAB — GLUCOSE SERPL-MCNC: 94 MG/DL (ref 65–140)

## 2021-09-15 PROCEDURE — 78815 PET IMAGE W/CT SKULL-THIGH: CPT

## 2021-09-15 PROCEDURE — 82948 REAGENT STRIP/BLOOD GLUCOSE: CPT

## 2021-09-15 PROCEDURE — A9552 F18 FDG: HCPCS

## 2021-09-15 PROCEDURE — G1004 CDSM NDSC: HCPCS

## 2021-09-16 ENCOUNTER — TELEPHONE (OUTPATIENT)
Dept: HEMATOLOGY ONCOLOGY | Facility: CLINIC | Age: 41
End: 2021-09-16

## 2021-09-16 ENCOUNTER — OFFICE VISIT (OUTPATIENT)
Dept: HEMATOLOGY ONCOLOGY | Facility: CLINIC | Age: 41
End: 2021-09-16
Payer: COMMERCIAL

## 2021-09-16 ENCOUNTER — APPOINTMENT (OUTPATIENT)
Dept: LAB | Facility: HOSPITAL | Age: 41
End: 2021-09-16
Attending: INTERNAL MEDICINE
Payer: COMMERCIAL

## 2021-09-16 VITALS
BODY MASS INDEX: 26.79 KG/M2 | OXYGEN SATURATION: 98 % | RESPIRATION RATE: 17 BRPM | HEART RATE: 82 BPM | SYSTOLIC BLOOD PRESSURE: 108 MMHG | WEIGHT: 184 LBS | DIASTOLIC BLOOD PRESSURE: 74 MMHG | TEMPERATURE: 97.6 F

## 2021-09-16 DIAGNOSIS — D70.1 CHEMOTHERAPY INDUCED NEUTROPENIA (HCC): ICD-10-CM

## 2021-09-16 DIAGNOSIS — T45.1X5A CHEMOTHERAPY INDUCED NEUTROPENIA (HCC): ICD-10-CM

## 2021-09-16 DIAGNOSIS — R91.1 SOLITARY PULMONARY NODULE: Primary | ICD-10-CM

## 2021-09-16 DIAGNOSIS — C84.48 PERIPHERAL T-CELL LYMPHOMA OF LYMPH NODES OF MULTIPLE REGIONS (HCC): ICD-10-CM

## 2021-09-16 LAB
ALBUMIN SERPL BCP-MCNC: 3.9 G/DL (ref 3.5–5)
ALP SERPL-CCNC: 138 U/L (ref 46–116)
ALT SERPL W P-5'-P-CCNC: 48 U/L (ref 12–78)
ANION GAP SERPL CALCULATED.3IONS-SCNC: 4 MMOL/L (ref 4–13)
ANISOCYTOSIS BLD QL SMEAR: PRESENT
ARTIFACT: PRESENT
AST SERPL W P-5'-P-CCNC: 24 U/L (ref 5–45)
BASOPHILS # BLD MANUAL: 0.19 THOUSAND/UL (ref 0–0.1)
BASOPHILS NFR MAR MANUAL: 2 % (ref 0–1)
BILIRUB SERPL-MCNC: 0.69 MG/DL (ref 0.2–1)
BUN SERPL-MCNC: 10 MG/DL (ref 5–25)
CALCIUM SERPL-MCNC: 9.9 MG/DL (ref 8.3–10.1)
CHLORIDE SERPL-SCNC: 105 MMOL/L (ref 100–108)
CO2 SERPL-SCNC: 28 MMOL/L (ref 21–32)
CREAT SERPL-MCNC: 0.84 MG/DL (ref 0.6–1.3)
EOSINOPHIL # BLD MANUAL: 0.1 THOUSAND/UL (ref 0–0.4)
EOSINOPHIL NFR BLD MANUAL: 1 % (ref 0–6)
ERYTHROCYTE [DISTWIDTH] IN BLOOD BY AUTOMATED COUNT: 19.5 % (ref 11.6–15.1)
GFR SERPL CREATININE-BSD FRML MDRD: 110 ML/MIN/1.73SQ M
GLUCOSE P FAST SERPL-MCNC: 106 MG/DL (ref 65–99)
HCT VFR BLD AUTO: 38.8 % (ref 36.5–49.3)
HGB BLD-MCNC: 13.2 G/DL (ref 12–17)
LYMPHOCYTES # BLD AUTO: 1.62 THOUSAND/UL (ref 0.6–4.47)
LYMPHOCYTES # BLD AUTO: 17 % (ref 14–44)
MCH RBC QN AUTO: 31.6 PG (ref 26.8–34.3)
MCHC RBC AUTO-ENTMCNC: 34 G/DL (ref 31.4–37.4)
MCV RBC AUTO: 93 FL (ref 82–98)
MONOCYTES # BLD AUTO: 0.76 THOUSAND/UL (ref 0–1.22)
MONOCYTES NFR BLD: 8 % (ref 4–12)
MYELOCYTES NFR BLD MANUAL: 1 % (ref 0–1)
NEUTROPHILS # BLD MANUAL: 6.76 THOUSAND/UL (ref 1.85–7.62)
NEUTS BAND NFR BLD MANUAL: 1 % (ref 0–8)
NEUTS SEG NFR BLD AUTO: 70 % (ref 43–75)
PLATELET # BLD AUTO: 300 THOUSANDS/UL (ref 149–390)
PLATELET BLD QL SMEAR: ADEQUATE
PMV BLD AUTO: 8.7 FL (ref 8.9–12.7)
POLYCHROMASIA BLD QL SMEAR: PRESENT
POTASSIUM SERPL-SCNC: 4.5 MMOL/L (ref 3.5–5.3)
PROT SERPL-MCNC: 7 G/DL (ref 6.4–8.2)
RBC # BLD AUTO: 4.18 MILLION/UL (ref 3.88–5.62)
RBC MORPH BLD: PRESENT
SODIUM SERPL-SCNC: 137 MMOL/L (ref 136–145)
WBC # BLD AUTO: 9.52 THOUSAND/UL (ref 4.31–10.16)

## 2021-09-16 PROCEDURE — 99215 OFFICE O/P EST HI 40 MIN: CPT | Performed by: INTERNAL MEDICINE

## 2021-09-16 PROCEDURE — 36415 COLL VENOUS BLD VENIPUNCTURE: CPT

## 2021-09-16 PROCEDURE — 85027 COMPLETE CBC AUTOMATED: CPT

## 2021-09-16 PROCEDURE — 80053 COMPREHEN METABOLIC PANEL: CPT

## 2021-09-16 PROCEDURE — 85007 BL SMEAR W/DIFF WBC COUNT: CPT

## 2021-09-16 NOTE — TELEPHONE ENCOUNTER
Spoke with Mason with Dr Angelo Trevino and sent over the information needed for this patient to be seen by Dr Paulie Leon per Dr Zackary Ordonez

## 2021-09-16 NOTE — PROGRESS NOTES
[facility] [place]  [address] [place]    [place] [address]  [phone]  [address] [facility] [place] [id number]  09/16/21    Discussion:   In summary, this is a 56-year-old male history of peripheral T-cell lymphoma, NOS, stage IV, as outlined  He has been on Adcetris/CHOP  Clinically he is doing fairly well  He continues to work 50-60% of his regular schedule  He has moderate fatigue intermittently  He denies any neuropathic symptoms  Blood counts have been essentially normal   CMP likewise  Recent PET-CT shows decrease in lymphadenopathy  No hypermetabolism is noted  There are, however, some new pulmonary nodules measuring 3-5 mm  This is below the threshold for hypermetabolism  I am suspicious this may represent evolving lymphoma  I reviewed his case with Dr Renee Hector  He agrees that transplantation is indicated  We will make arrangements for formal transplant evaluation  Repeat CT scan is requested just prior to his 5th cycle  I reviewed the above considerations at length with the patient  He voiced understanding and agreement   __________________________________________________________    Chief Complaint   Patient presents with    Follow-up       HPI:  Oncology History   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 ([facility] [address] )   5/28/2021 Initial Diagnosis    Patient had COVID vaccination in early April, 2nd injection in early May  A week or 2 later he developed substantial bilateral cervical adenopathy  May 28, 2021 CT of the neck showed diffuse bilateral cervical adenopathy measuring up to 2 5 cm on the right  6/17/2021 Biopsy    Right neck lymph node, Biopsy:     - Peripheral T-cell lymphoma, not otherwise specified, see note       7/19/2021 -  Chemotherapy    DOXOrubicin (ADRIAMYCIN), 101 mg, Intravenous, Once, 3 of 6 cycles  Administration: 100 mg (7/19/2021), 101 mg (8/9/2021), 101 mg (8/30/2021)  pegfilgrastim (Josiephine Rosalio), 6 mg, Subcutaneous, Once, 2 of 5 cycles  Administration: 6 mg (8/9/2021), 6 mg (8/30/2021)  cyclophosphamide (CYTOXAN) IVPB, 1,516 mg, Intravenous, Once, 3 of 6 cycles  Administration: 1,500 mg (7/19/2021), 1,500 mg (8/9/2021), 1,500 mg (8/30/2021)  fosaprepitant (EMEND) IVPB, 150 mg, Intravenous, Once, 3 of 6 cycles  Administration: 150 mg (7/19/2021), 150 mg (8/9/2021), 150 mg (8/30/2021)  brentuximab (ADCETRIS) IVPB, 154 5 mg, Intravenous, Once, 3 of 6 cycles  Administration: 150 mg (8/9/2021), 150 mg (8/30/2021), 150 mg (7/23/2021)     7/23/2021 -  Cancer Staged    July 23, 2021 PET-CT showed marked decrease in the size of cervical and mediastinal lymph nodes compared to prior CT scan of May 28th 2021  Abdominal pelvic lymph nodes measure para aortic nodes up to 1 5 cm  Inguinal nodes up to 2 4 cm and a lucent defect with sclerotic margins in the left femoral neck measuring 1 8 cm  Although these lesions showed SUV of 2 1 max  Interval History:  Clinically stable  ECOG-  1 - Symptomatic but completely ambulatory    Review of Systems   Constitutional: Negative for chills and fever  HENT: Negative for nosebleeds  Eyes: Negative for discharge  Respiratory: Negative for cough and shortness of breath  Cardiovascular: Negative for chest pain  Gastrointestinal: Negative for abdominal pain, constipation and diarrhea  Endocrine: Negative for polydipsia  Genitourinary: Negative for hematuria  Musculoskeletal: Negative for arthralgias  Skin: Negative for color change  Allergic/Immunologic: Negative for immunocompromised state  Neurological: Negative for dizziness and headaches  Hematological: Negative for adenopathy  Psychiatric/Behavioral: Negative for agitation         Past Medical History:   Diagnosis Date    Interstitial keratitis     Lymphoma, peripheral T-cell ([facility] [address] )      Patient Active Problem List   Diagnosis    Lymphadenopathy    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 (HCC)    Rash    Eosinophilia    Chemotherapy induced neutropenia (HCC)    Thrombocytosis (HCC)       Current Outpatient Medications:     allopurinol (ZYLOPRIM) 300 mg tablet, TAKE 1 TABLET BY MOUTH EVERY DAY, Disp: 30 tablet, Rfl: 1    ondansetron (ZOFRAN) 8 mg tablet, Take 1 tablet (8 mg total) by mouth every 8 (eight) hours as needed for nausea or vomiting, Disp: 30 tablet, Rfl: 3    predniSONE 50 mg tablet, Take 2 tabs by mouth each day with breakfast starting the day after chemotherapy  , Disp: 10 tablet, Rfl: 5  No Known Allergies  Past Surgical History:   Procedure Laterality Date    IR PORT PLACEMENT  7/13/2021    LYMPH NODE BIOPSY Right 6/17/2021    Procedure: EXCISIONAL BIOPSY OF RIGHT NECK LYMPH NODE WITH LYMPHOMA PROTOCOL;  Surgeon: Dhara Jimenez MD;  [facility]: [facility];  Service: Surgical Oncology    WISDOM TOOTH EXTRACTION       Social History     Objective:  Vitals:    09/16/21 0905   BP: 108/74   BP Location: Left arm   Patient Position: Sitting   Pulse: 82   Resp: 17   Temp: 97 6 °F (36 4 °C)   TempSrc: Tympanic   SpO2: 98%   Weight: 83 5 kg (184 lb)     Physical Exam  Constitutional:       Appearance: He is well-developed  HENT:      Head: Normocephalic and atraumatic  Eyes:      Pupils: Pupils are equal, round, and reactive to light  Cardiovascular:      Rate and Rhythm: Normal rate and regular rhythm  Heart sounds: No murmur heard  Pulmonary:      Breath sounds: Normal breath sounds  No wheezing or rales  Abdominal:      Palpations: Abdomen is soft  Tenderness: There is no abdominal tenderness  Musculoskeletal:         General: No tenderness  Normal range of motion  Cervical back: Neck supple  Lymphadenopathy:      Cervical: No cervical adenopathy  Skin:     Findings: No erythema or rash  Neurological:      Mental Status: He is alert and oriented to person, place, and time  Cranial Nerves:  No cranial nerve deficit  Deep Tendon Reflexes: Reflexes are normal and symmetric  Psychiatric:         Behavior: Behavior normal            Labs: I personally reviewed the labs and imaging pertinent to this patient care

## 2021-09-20 ENCOUNTER — HOSPITAL ENCOUNTER (OUTPATIENT)
Dept: INFUSION CENTER | Facility: CLINIC | Age: 41
Discharge: HOME | End: 2021-09-20
Payer: COMMERCIAL

## 2021-09-20 VITALS
RESPIRATION RATE: 16 BRPM | HEART RATE: 86 BPM | SYSTOLIC BLOOD PRESSURE: 124 MMHG | OXYGEN SATURATION: 100 % | DIASTOLIC BLOOD PRESSURE: 78 MMHG | BODY MASS INDEX: 27.17 KG/M2 | TEMPERATURE: 96.8 F | WEIGHT: 183.42 LBS | HEIGHT: 69 IN

## 2021-09-20 DIAGNOSIS — C84.48 PERIPHERAL T-CELL LYMPHOMA OF LYMPH NODES OF MULTIPLE REGIONS (HCC): ICD-10-CM

## 2021-09-20 DIAGNOSIS — D70.1 CHEMOTHERAPY INDUCED NEUTROPENIA (HCC): Primary | ICD-10-CM

## 2021-09-20 DIAGNOSIS — T45.1X5A CHEMOTHERAPY INDUCED NEUTROPENIA (HCC): Primary | ICD-10-CM

## 2021-09-20 PROCEDURE — 96417 CHEMO IV INFUS EACH ADDL SEQ: CPT

## 2021-09-20 PROCEDURE — 96377 APPLICATON ON-BODY INJECTOR: CPT

## 2021-09-20 PROCEDURE — 96413 CHEMO IV INFUSION 1 HR: CPT

## 2021-09-20 PROCEDURE — 96367 TX/PROPH/DG ADDL SEQ IV INF: CPT

## 2021-09-20 PROCEDURE — 96411 CHEMO IV PUSH ADDL DRUG: CPT

## 2021-09-20 RX ORDER — SODIUM CHLORIDE 9 MG/ML
20 INJECTION, SOLUTION INTRAVENOUS ONCE
Status: COMPLETED | OUTPATIENT
Start: 2021-09-20 | End: 2021-09-20

## 2021-09-20 RX ORDER — DOXORUBICIN HYDROCHLORIDE 2 MG/ML
50 INJECTION, SOLUTION INTRAVENOUS ONCE
Status: COMPLETED | OUTPATIENT
Start: 2021-09-20 | End: 2021-09-20

## 2021-09-20 RX ADMIN — PEGFILGRASTIM 6 MG: KIT SUBCUTANEOUS at 14:30

## 2021-09-20 RX ADMIN — BRENTUXIMAB VEDOTIN 150 MG: 50 INJECTION, POWDER, LYOPHILIZED, FOR SOLUTION INTRAVENOUS at 13:01

## 2021-09-20 RX ADMIN — CYCLOPHOSPHAMIDE 1500 MG: 1 INJECTION, POWDER, FOR SOLUTION INTRAVENOUS; ORAL at 13:37

## 2021-09-20 RX ADMIN — SODIUM CHLORIDE 20 ML/HR: 0.9 INJECTION, SOLUTION INTRAVENOUS at 11:52

## 2021-09-20 RX ADMIN — DEXAMETHASONE SODIUM PHOSPHATE: 10 INJECTION, SOLUTION INTRAMUSCULAR; INTRAVENOUS at 11:53

## 2021-09-20 RX ADMIN — FOSAPREPITANT 150 MG: 150 INJECTION, POWDER, LYOPHILIZED, FOR SOLUTION INTRAVENOUS at 12:23

## 2021-09-20 RX ADMIN — DOXORUBICIN HYDROCHLORIDE 101 MG: 2 INJECTION, SOLUTION INTRAVENOUS at 14:14

## 2021-09-20 NOTE — PROGRESS NOTES
Patient tolerated treatment without incident  Neulasta OnPro applied to left upper arm, green light blinking  Patient aware of when to remove it  AVS printed and reviewed with patient

## 2021-09-24 ENCOUNTER — TELEPHONE (OUTPATIENT)
Dept: HEMATOLOGY ONCOLOGY | Facility: CLINIC | Age: 41
End: 2021-09-24

## 2021-09-24 NOTE — TELEPHONE ENCOUNTER
Pt has ct chest 10-2-21 and wants to know if Dr Rico Dias wants it done with no contrast, call back #  [phone]

## 2021-09-25 DIAGNOSIS — C84.48 PERIPHERAL T-CELL LYMPHOMA OF LYMPH NODES OF MULTIPLE REGIONS (HCC): ICD-10-CM

## 2021-09-27 ENCOUNTER — TELEPHONE (OUTPATIENT)
Dept: HEMATOLOGY ONCOLOGY | Facility: CLINIC | Age: 41
End: 2021-09-27

## 2021-09-27 NOTE — TELEPHONE ENCOUNTER
Patient would like to discuss changing his intermit leave of absence  to permanent leave of absence  Best call back [phone]

## 2021-09-28 RX ORDER — ALLOPURINOL 300 MG/1
TABLET ORAL
Qty: 30 TABLET | Refills: 1 | Status: SHIPPED | OUTPATIENT
Start: 2021-09-28 | End: 2021-11-01

## 2021-09-28 NOTE — TELEPHONE ENCOUNTER
Patient is calling in to inform of side effects that he is currently experiencing, he indicates that he is experiencing     Fatigue   Blurred vision   Discharge from both eyes   Heart palpitations    He would like to know what he can do  He is also calling for an update on discussing the change of his intermit leave of absence   He can be reached back at

## 2021-09-29 NOTE — TELEPHONE ENCOUNTER
Pt called back to discuss leave of absence   Pt also still having mild palpitations but no blurred vision,  please assist, call back #   [phone]

## 2021-09-29 NOTE — TELEPHONE ENCOUNTER
Returned call to patient  He reports increase in HR and "feeling heavy" when going up stairs and exerting himself  He denies SOB or palpitations   Patient also sated he had discharge from his eyes when he wakes up in the morning  His vision becomes cloudy  He denies blurred vision  He reports his eyes cleared up today after he washed his face this morning  Per Dr Mendoza Reddy patients fatigue and accelerated HR with activity are likely related to treatment and being deconditioned  Patient will continue to monitor and call with worsening symptoms or SOB  He will also continue to monitor eye drainage and keep area clean    He will discuss further with Dr Mendoza Reddy at next follow up on 10/6/21    Dr Mendoza Reddy is agreeable to fill out additional paperwork for work to allow him to get extended pail leave  Patient will bring papers to the office once he receives them

## 2021-10-01 ENCOUNTER — TELEPHONE (OUTPATIENT)
Dept: CARDIAC SURGERY | Facility: CLINIC | Age: 41
End: 2021-10-01

## 2021-10-02 ENCOUNTER — HOSPITAL ENCOUNTER (OUTPATIENT)
Dept: RADIOLOGY | Facility: HOSPITAL | Age: 41
Discharge: HOME | End: 2021-10-02
Attending: INTERNAL MEDICINE
Payer: COMMERCIAL

## 2021-10-02 DIAGNOSIS — R91.1 SOLITARY PULMONARY NODULE: ICD-10-CM

## 2021-10-02 PROCEDURE — 71250 CT THORAX DX C-: CPT

## 2021-10-02 PROCEDURE — G1004 CDSM NDSC: HCPCS

## 2021-10-05 ENCOUNTER — PATIENT OUTREACH (OUTPATIENT)
Dept: HEMATOLOGY ONCOLOGY | Facility: CLINIC | Age: 41
End: 2021-10-05

## 2021-10-05 RX ORDER — DOXORUBICIN HYDROCHLORIDE 2 MG/ML
50 INJECTION, SOLUTION INTRAVENOUS ONCE
Status: CANCELLED | OUTPATIENT
Start: 2021-10-12

## 2021-10-05 RX ORDER — SODIUM CHLORIDE 9 MG/ML
20 INJECTION, SOLUTION INTRAVENOUS ONCE
Status: CANCELLED | OUTPATIENT
Start: 2021-10-12

## 2021-10-06 ENCOUNTER — PATIENT OUTREACH (OUTPATIENT)
Dept: HEMATOLOGY ONCOLOGY | Facility: CLINIC | Age: 41
End: 2021-10-06

## 2021-10-06 ENCOUNTER — DOCUMENTATION (OUTPATIENT)
Dept: HEMATOLOGY ONCOLOGY | Facility: CLINIC | Age: 41
End: 2021-10-06

## 2021-10-06 ENCOUNTER — OFFICE VISIT (OUTPATIENT)
Dept: HEMATOLOGY ONCOLOGY | Facility: CLINIC | Age: 41
End: 2021-10-06
Payer: COMMERCIAL

## 2021-10-06 VITALS
RESPIRATION RATE: 17 BRPM | DIASTOLIC BLOOD PRESSURE: 72 MMHG | OXYGEN SATURATION: 98 % | WEIGHT: 185 LBS | HEIGHT: 70 IN | BODY MASS INDEX: 26.48 KG/M2 | SYSTOLIC BLOOD PRESSURE: 112 MMHG | HEART RATE: 78 BPM | TEMPERATURE: 97.9 F

## 2021-10-06 DIAGNOSIS — C84.48 PERIPHERAL T-CELL LYMPHOMA OF LYMPH NODES OF MULTIPLE REGIONS (HCC): Primary | ICD-10-CM

## 2021-10-06 DIAGNOSIS — F06.4 ANXIETY DISORDER DUE TO MEDICAL CONDITION: ICD-10-CM

## 2021-10-06 DIAGNOSIS — R53.0 NEOPLASTIC MALIGNANT RELATED FATIGUE: ICD-10-CM

## 2021-10-06 PROCEDURE — 99215 OFFICE O/P EST HI 40 MIN: CPT | Performed by: INTERNAL MEDICINE

## 2021-10-11 ENCOUNTER — APPOINTMENT (OUTPATIENT)
Dept: LAB | Facility: HOSPITAL | Age: 41
End: 2021-10-11
Attending: INTERNAL MEDICINE
Payer: COMMERCIAL

## 2021-10-11 DIAGNOSIS — D70.1 CHEMOTHERAPY INDUCED NEUTROPENIA (HCC): ICD-10-CM

## 2021-10-11 DIAGNOSIS — T45.1X5A CHEMOTHERAPY INDUCED NEUTROPENIA (HCC): ICD-10-CM

## 2021-10-11 DIAGNOSIS — C84.48 PERIPHERAL T-CELL LYMPHOMA OF LYMPH NODES OF MULTIPLE REGIONS (HCC): ICD-10-CM

## 2021-10-11 LAB
ALBUMIN SERPL BCP-MCNC: 3.8 G/DL (ref 3.5–5)
ALP SERPL-CCNC: 97 U/L (ref 46–116)
ALT SERPL W P-5'-P-CCNC: 36 U/L (ref 12–78)
ANION GAP SERPL CALCULATED.3IONS-SCNC: 2 MMOL/L (ref 4–13)
AST SERPL W P-5'-P-CCNC: 22 U/L (ref 5–45)
BASOPHILS # BLD AUTO: 0.09 THOUSANDS/ΜL (ref 0–0.1)
BASOPHILS NFR BLD AUTO: 2 % (ref 0–1)
BILIRUB SERPL-MCNC: 0.45 MG/DL (ref 0.2–1)
BUN SERPL-MCNC: 6 MG/DL (ref 5–25)
CALCIUM SERPL-MCNC: 10 MG/DL (ref 8.3–10.1)
CHLORIDE SERPL-SCNC: 109 MMOL/L (ref 100–108)
CO2 SERPL-SCNC: 28 MMOL/L (ref 21–32)
CREAT SERPL-MCNC: 0.83 MG/DL (ref 0.6–1.3)
EOSINOPHIL # BLD AUTO: 0.04 THOUSAND/ΜL (ref 0–0.61)
EOSINOPHIL NFR BLD AUTO: 1 % (ref 0–6)
ERYTHROCYTE [DISTWIDTH] IN BLOOD BY AUTOMATED COUNT: 17.2 % (ref 11.6–15.1)
GFR SERPL CREATININE-BSD FRML MDRD: 110 ML/MIN/1.73SQ M
GLUCOSE SERPL-MCNC: 98 MG/DL (ref 65–140)
HCT VFR BLD AUTO: 37.7 % (ref 36.5–49.3)
HGB BLD-MCNC: 12.8 G/DL (ref 12–17)
IMM GRANULOCYTES # BLD AUTO: 0.04 THOUSAND/UL (ref 0–0.2)
IMM GRANULOCYTES NFR BLD AUTO: 1 % (ref 0–2)
LYMPHOCYTES # BLD AUTO: 1.45 THOUSANDS/ΜL (ref 0.6–4.47)
LYMPHOCYTES NFR BLD AUTO: 25 % (ref 14–44)
MCH RBC QN AUTO: 33.3 PG (ref 26.8–34.3)
MCHC RBC AUTO-ENTMCNC: 34 G/DL (ref 31.4–37.4)
MCV RBC AUTO: 98 FL (ref 82–98)
MONOCYTES # BLD AUTO: 0.63 THOUSAND/ΜL (ref 0.17–1.22)
MONOCYTES NFR BLD AUTO: 11 % (ref 4–12)
NEUTROPHILS # BLD AUTO: 3.48 THOUSANDS/ΜL (ref 1.85–7.62)
NEUTS SEG NFR BLD AUTO: 60 % (ref 43–75)
NRBC BLD AUTO-RTO: 0 /100 WBCS
PLATELET # BLD AUTO: 345 THOUSANDS/UL (ref 149–390)
PMV BLD AUTO: 9 FL (ref 8.9–12.7)
POTASSIUM SERPL-SCNC: 4.2 MMOL/L (ref 3.5–5.3)
PROT SERPL-MCNC: 6.9 G/DL (ref 6.4–8.2)
RBC # BLD AUTO: 3.84 MILLION/UL (ref 3.88–5.62)
SODIUM SERPL-SCNC: 139 MMOL/L (ref 136–145)
WBC # BLD AUTO: 5.73 THOUSAND/UL (ref 4.31–10.16)

## 2021-10-11 PROCEDURE — 85025 COMPLETE CBC W/AUTO DIFF WBC: CPT

## 2021-10-11 PROCEDURE — 80053 COMPREHEN METABOLIC PANEL: CPT

## 2021-10-11 PROCEDURE — 36415 COLL VENOUS BLD VENIPUNCTURE: CPT

## 2021-10-12 ENCOUNTER — HOSPITAL ENCOUNTER (OUTPATIENT)
Dept: INFUSION CENTER | Facility: CLINIC | Age: 41
Discharge: HOME | End: 2021-10-12
Payer: COMMERCIAL

## 2021-10-12 VITALS
WEIGHT: 184 LBS | BODY MASS INDEX: 27.25 KG/M2 | OXYGEN SATURATION: 98 % | RESPIRATION RATE: 17 BRPM | SYSTOLIC BLOOD PRESSURE: 112 MMHG | HEIGHT: 69 IN | TEMPERATURE: 97.2 F | HEART RATE: 78 BPM | DIASTOLIC BLOOD PRESSURE: 66 MMHG

## 2021-10-12 DIAGNOSIS — C84.48 PERIPHERAL T-CELL LYMPHOMA OF LYMPH NODES OF MULTIPLE REGIONS (HCC): ICD-10-CM

## 2021-10-12 DIAGNOSIS — T45.1X5A CHEMOTHERAPY INDUCED NEUTROPENIA (HCC): Primary | ICD-10-CM

## 2021-10-12 DIAGNOSIS — D70.1 CHEMOTHERAPY INDUCED NEUTROPENIA (HCC): Primary | ICD-10-CM

## 2021-10-12 PROCEDURE — 96413 CHEMO IV INFUSION 1 HR: CPT

## 2021-10-12 PROCEDURE — 96411 CHEMO IV PUSH ADDL DRUG: CPT

## 2021-10-12 PROCEDURE — 96377 APPLICATON ON-BODY INJECTOR: CPT

## 2021-10-12 PROCEDURE — 96417 CHEMO IV INFUS EACH ADDL SEQ: CPT

## 2021-10-12 PROCEDURE — 96367 TX/PROPH/DG ADDL SEQ IV INF: CPT

## 2021-10-12 RX ORDER — SODIUM CHLORIDE 9 MG/ML
20 INJECTION, SOLUTION INTRAVENOUS ONCE
Status: COMPLETED | OUTPATIENT
Start: 2021-10-12 | End: 2021-10-12

## 2021-10-12 RX ORDER — DOXORUBICIN HYDROCHLORIDE 2 MG/ML
50 INJECTION, SOLUTION INTRAVENOUS ONCE
Status: COMPLETED | OUTPATIENT
Start: 2021-10-12 | End: 2021-10-12

## 2021-10-12 RX ADMIN — BRENTUXIMAB VEDOTIN 150 MG: 50 INJECTION, POWDER, LYOPHILIZED, FOR SOLUTION INTRAVENOUS at 10:23

## 2021-10-12 RX ADMIN — DOXORUBICIN HYDROCHLORIDE 101 MG: 2 INJECTION, SOLUTION INTRAVENOUS at 11:32

## 2021-10-12 RX ADMIN — CYCLOPHOSPHAMIDE 1500 MG: 1 INJECTION, POWDER, FOR SOLUTION INTRAVENOUS; ORAL at 10:55

## 2021-10-12 RX ADMIN — PEGFILGRASTIM 6 MG: KIT SUBCUTANEOUS at 11:47

## 2021-10-12 RX ADMIN — FOSAPREPITANT 150 MG: 150 INJECTION, POWDER, LYOPHILIZED, FOR SOLUTION INTRAVENOUS at 09:38

## 2021-10-12 RX ADMIN — SODIUM CHLORIDE 20 ML/HR: 0.9 INJECTION, SOLUTION INTRAVENOUS at 09:14

## 2021-10-12 RX ADMIN — DEXAMETHASONE SODIUM PHOSPHATE: 10 INJECTION, SOLUTION INTRAMUSCULAR; INTRAVENOUS at 09:16

## 2021-10-18 ENCOUNTER — TELEPHONE (OUTPATIENT)
Dept: HEMATOLOGY ONCOLOGY | Facility: CLINIC | Age: 41
End: 2021-10-18

## 2021-10-20 ENCOUNTER — TELEPHONE (OUTPATIENT)
Dept: HEMATOLOGY ONCOLOGY | Facility: CLINIC | Age: 41
End: 2021-10-20

## 2021-10-21 ENCOUNTER — TELEPHONE (OUTPATIENT)
Dept: HEMATOLOGY ONCOLOGY | Facility: CLINIC | Age: 41
End: 2021-10-21

## 2021-10-21 DIAGNOSIS — H53.8 BLURRY VISION, LEFT EYE: Primary | ICD-10-CM

## 2021-10-24 DIAGNOSIS — C84.48 PERIPHERAL T-CELL LYMPHOMA OF LYMPH NODES OF MULTIPLE REGIONS (HCC): ICD-10-CM

## 2021-10-28 ENCOUNTER — APPOINTMENT (OUTPATIENT)
Dept: LAB | Facility: HOSPITAL | Age: 41
End: 2021-10-28
Attending: INTERNAL MEDICINE
Payer: COMMERCIAL

## 2021-10-28 ENCOUNTER — OFFICE VISIT (OUTPATIENT)
Dept: HEMATOLOGY ONCOLOGY | Facility: CLINIC | Age: 41
End: 2021-10-28
Payer: COMMERCIAL

## 2021-10-28 VITALS
SYSTOLIC BLOOD PRESSURE: 126 MMHG | DIASTOLIC BLOOD PRESSURE: 86 MMHG | BODY MASS INDEX: 28.07 KG/M2 | OXYGEN SATURATION: 98 % | RESPIRATION RATE: 16 BRPM | WEIGHT: 189.5 LBS | HEART RATE: 68 BPM | HEIGHT: 69 IN | TEMPERATURE: 98 F

## 2021-10-28 DIAGNOSIS — T45.1X5A CHEMOTHERAPY INDUCED NEUTROPENIA (HCC): ICD-10-CM

## 2021-10-28 DIAGNOSIS — D70.1 CHEMOTHERAPY INDUCED NEUTROPENIA (HCC): ICD-10-CM

## 2021-10-28 DIAGNOSIS — C84.48 PERIPHERAL T-CELL LYMPHOMA OF LYMPH NODES OF MULTIPLE REGIONS (HCC): Primary | ICD-10-CM

## 2021-10-28 DIAGNOSIS — D75.839 THROMBOCYTOSIS: ICD-10-CM

## 2021-10-28 DIAGNOSIS — C84.48 PERIPHERAL T-CELL LYMPHOMA OF LYMPH NODES OF MULTIPLE REGIONS (HCC): ICD-10-CM

## 2021-10-28 LAB
ALBUMIN SERPL BCP-MCNC: 4 G/DL (ref 3.5–5)
ALP SERPL-CCNC: 138 U/L (ref 46–116)
ALT SERPL W P-5'-P-CCNC: 53 U/L (ref 12–78)
ANION GAP SERPL CALCULATED.3IONS-SCNC: 4 MMOL/L (ref 4–13)
AST SERPL W P-5'-P-CCNC: 28 U/L (ref 5–45)
BASOPHILS # BLD AUTO: 0.08 THOUSANDS/ΜL (ref 0–0.1)
BASOPHILS NFR BLD AUTO: 1 % (ref 0–1)
BILIRUB SERPL-MCNC: 0.47 MG/DL (ref 0.2–1)
BUN SERPL-MCNC: 10 MG/DL (ref 5–25)
CALCIUM SERPL-MCNC: 10 MG/DL (ref 8.3–10.1)
CHLORIDE SERPL-SCNC: 103 MMOL/L (ref 100–108)
CO2 SERPL-SCNC: 30 MMOL/L (ref 21–32)
CREAT SERPL-MCNC: 0.81 MG/DL (ref 0.6–1.3)
EOSINOPHIL # BLD AUTO: 0.03 THOUSAND/ΜL (ref 0–0.61)
EOSINOPHIL NFR BLD AUTO: 0 % (ref 0–6)
ERYTHROCYTE [DISTWIDTH] IN BLOOD BY AUTOMATED COUNT: 15.3 % (ref 11.6–15.1)
GFR SERPL CREATININE-BSD FRML MDRD: 111 ML/MIN/1.73SQ M
GLUCOSE SERPL-MCNC: 93 MG/DL (ref 65–140)
HCT VFR BLD AUTO: 38.6 % (ref 36.5–49.3)
HGB BLD-MCNC: 12.9 G/DL (ref 12–17)
IMM GRANULOCYTES # BLD AUTO: 0.18 THOUSAND/UL (ref 0–0.2)
IMM GRANULOCYTES NFR BLD AUTO: 2 % (ref 0–2)
LYMPHOCYTES # BLD AUTO: 1.97 THOUSANDS/ΜL (ref 0.6–4.47)
LYMPHOCYTES NFR BLD AUTO: 21 % (ref 14–44)
MCH RBC QN AUTO: 33.3 PG (ref 26.8–34.3)
MCHC RBC AUTO-ENTMCNC: 33.4 G/DL (ref 31.4–37.4)
MCV RBC AUTO: 100 FL (ref 82–98)
MONOCYTES # BLD AUTO: 0.97 THOUSAND/ΜL (ref 0.17–1.22)
MONOCYTES NFR BLD AUTO: 10 % (ref 4–12)
NEUTROPHILS # BLD AUTO: 6.1 THOUSANDS/ΜL (ref 1.85–7.62)
NEUTS SEG NFR BLD AUTO: 66 % (ref 43–75)
NRBC BLD AUTO-RTO: 0 /100 WBCS
PLATELET # BLD AUTO: 304 THOUSANDS/UL (ref 149–390)
PMV BLD AUTO: 8.8 FL (ref 8.9–12.7)
POTASSIUM SERPL-SCNC: 4.3 MMOL/L (ref 3.5–5.3)
PROT SERPL-MCNC: 7.3 G/DL (ref 6.4–8.2)
RBC # BLD AUTO: 3.87 MILLION/UL (ref 3.88–5.62)
SODIUM SERPL-SCNC: 137 MMOL/L (ref 136–145)
WBC # BLD AUTO: 9.33 THOUSAND/UL (ref 4.31–10.16)

## 2021-10-28 PROCEDURE — 99214 OFFICE O/P EST MOD 30 MIN: CPT | Performed by: NURSE PRACTITIONER

## 2021-10-28 PROCEDURE — 36415 COLL VENOUS BLD VENIPUNCTURE: CPT

## 2021-10-28 PROCEDURE — 80053 COMPREHEN METABOLIC PANEL: CPT

## 2021-10-28 PROCEDURE — 85025 COMPLETE CBC W/AUTO DIFF WBC: CPT

## 2021-10-28 RX ORDER — DOXORUBICIN HYDROCHLORIDE 2 MG/ML
50 INJECTION, SOLUTION INTRAVENOUS ONCE
Status: CANCELLED | OUTPATIENT
Start: 2021-11-01

## 2021-10-28 RX ORDER — CHLORAL HYDRATE 500 MG
1000 CAPSULE ORAL DAILY
COMMUNITY

## 2021-10-28 RX ORDER — PREDNISOLONE ACETATE 10 MG/ML
1 SUSPENSION/ DROPS OPHTHALMIC 4 TIMES DAILY
COMMUNITY
End: 2022-02-10 | Stop reason: ALTCHOICE

## 2021-10-28 RX ORDER — MULTIVITAMIN
1 TABLET ORAL DAILY
COMMUNITY

## 2021-10-28 RX ORDER — PREDNISONE 50 MG/1
TABLET ORAL
Qty: 10 TABLET | Refills: 5 | Status: SHIPPED | OUTPATIENT
Start: 2021-10-28

## 2021-10-28 RX ORDER — SODIUM CHLORIDE 9 MG/ML
20 INJECTION, SOLUTION INTRAVENOUS ONCE
Status: CANCELLED | OUTPATIENT
Start: 2021-11-01

## 2021-11-01 ENCOUNTER — HOSPITAL ENCOUNTER (OUTPATIENT)
Dept: INFUSION CENTER | Facility: CLINIC | Age: 41
Discharge: HOME | End: 2021-11-01
Payer: COMMERCIAL

## 2021-11-01 VITALS
HEIGHT: 69 IN | OXYGEN SATURATION: 100 % | WEIGHT: 191 LBS | SYSTOLIC BLOOD PRESSURE: 104 MMHG | BODY MASS INDEX: 28.29 KG/M2 | DIASTOLIC BLOOD PRESSURE: 68 MMHG | RESPIRATION RATE: 16 BRPM | TEMPERATURE: 97 F | HEART RATE: 79 BPM

## 2021-11-01 DIAGNOSIS — T45.1X5A CHEMOTHERAPY INDUCED NEUTROPENIA (HCC): Primary | ICD-10-CM

## 2021-11-01 DIAGNOSIS — C84.48 PERIPHERAL T-CELL LYMPHOMA OF LYMPH NODES OF MULTIPLE REGIONS (HCC): ICD-10-CM

## 2021-11-01 DIAGNOSIS — D70.1 CHEMOTHERAPY INDUCED NEUTROPENIA (HCC): Primary | ICD-10-CM

## 2021-11-01 PROCEDURE — 96377 APPLICATON ON-BODY INJECTOR: CPT

## 2021-11-01 PROCEDURE — 96417 CHEMO IV INFUS EACH ADDL SEQ: CPT

## 2021-11-01 PROCEDURE — 96413 CHEMO IV INFUSION 1 HR: CPT

## 2021-11-01 PROCEDURE — 96411 CHEMO IV PUSH ADDL DRUG: CPT

## 2021-11-01 PROCEDURE — 96367 TX/PROPH/DG ADDL SEQ IV INF: CPT

## 2021-11-01 RX ORDER — SODIUM CHLORIDE 9 MG/ML
20 INJECTION, SOLUTION INTRAVENOUS ONCE
Status: COMPLETED | OUTPATIENT
Start: 2021-11-01 | End: 2021-11-01

## 2021-11-01 RX ORDER — DOXORUBICIN HYDROCHLORIDE 2 MG/ML
50 INJECTION, SOLUTION INTRAVENOUS ONCE
Status: COMPLETED | OUTPATIENT
Start: 2021-11-01 | End: 2021-11-01

## 2021-11-01 RX ORDER — ALLOPURINOL 300 MG/1
TABLET ORAL
Qty: 30 TABLET | Refills: 1 | Status: SHIPPED | OUTPATIENT
Start: 2021-11-01 | End: 2021-11-15

## 2021-11-01 RX ADMIN — DEXAMETHASONE SODIUM PHOSPHATE: 10 INJECTION, SOLUTION INTRAMUSCULAR; INTRAVENOUS at 08:40

## 2021-11-01 RX ADMIN — CYCLOPHOSPHAMIDE 1500 MG: 1 INJECTION, POWDER, FOR SOLUTION INTRAVENOUS; ORAL at 10:50

## 2021-11-01 RX ADMIN — BRENTUXIMAB VEDOTIN 150 MG: 50 INJECTION, POWDER, LYOPHILIZED, FOR SOLUTION INTRAVENOUS at 10:15

## 2021-11-01 RX ADMIN — SODIUM CHLORIDE 20 ML/HR: 0.9 INJECTION, SOLUTION INTRAVENOUS at 08:30

## 2021-11-01 RX ADMIN — PEGFILGRASTIM 6 MG: KIT SUBCUTANEOUS at 11:50

## 2021-11-01 RX ADMIN — DOXORUBICIN HYDROCHLORIDE 101 MG: 2 INJECTION, SOLUTION INTRAVENOUS at 11:30

## 2021-11-01 RX ADMIN — FOSAPREPITANT 150 MG: 150 INJECTION, POWDER, LYOPHILIZED, FOR SOLUTION INTRAVENOUS at 09:00

## 2021-11-02 ENCOUNTER — TELEPHONE (OUTPATIENT)
Dept: SURGICAL ONCOLOGY | Facility: CLINIC | Age: 41
End: 2021-11-02

## 2021-11-02 ENCOUNTER — TELEPHONE (OUTPATIENT)
Dept: INFUSION CENTER | Facility: CLINIC | Age: 41
End: 2021-11-02

## 2021-11-03 ENCOUNTER — PATIENT OUTREACH (OUTPATIENT)
Dept: HEMATOLOGY ONCOLOGY | Facility: CLINIC | Age: 41
End: 2021-11-03

## 2021-11-03 ENCOUNTER — HOSPITAL ENCOUNTER (OUTPATIENT)
Dept: INFUSION CENTER | Facility: CLINIC | Age: 41
Discharge: HOME | End: 2021-11-03
Payer: COMMERCIAL

## 2021-11-03 DIAGNOSIS — T45.1X5A CHEMOTHERAPY INDUCED NEUTROPENIA (HCC): Primary | ICD-10-CM

## 2021-11-03 DIAGNOSIS — D70.1 CHEMOTHERAPY INDUCED NEUTROPENIA (HCC): Primary | ICD-10-CM

## 2021-11-03 DIAGNOSIS — C84.48 PERIPHERAL T-CELL LYMPHOMA OF LYMPH NODES OF MULTIPLE REGIONS (HCC): ICD-10-CM

## 2021-11-03 PROCEDURE — 96372 THER/PROPH/DIAG INJ SC/IM: CPT

## 2021-11-03 RX ADMIN — PEGFILGRASTIM 6 MG: 6 INJECTION SUBCUTANEOUS at 14:02

## 2021-11-08 ENCOUNTER — HOSPITAL ENCOUNTER (OUTPATIENT)
Dept: MRI IMAGING | Facility: HOSPITAL | Age: 41
Discharge: HOME | End: 2021-11-08
Attending: INTERNAL MEDICINE
Payer: COMMERCIAL

## 2021-11-08 DIAGNOSIS — H53.8 BLURRY VISION, LEFT EYE: ICD-10-CM

## 2021-11-08 PROCEDURE — A9585 GADOBUTROL INJECTION: HCPCS | Performed by: INTERNAL MEDICINE

## 2021-11-08 PROCEDURE — 70553 MRI BRAIN STEM W/O & W/DYE: CPT

## 2021-11-08 PROCEDURE — G1004 CDSM NDSC: HCPCS

## 2021-11-08 RX ADMIN — GADOBUTROL 9 ML: 604.72 INJECTION INTRAVENOUS at 21:02

## 2021-11-10 ENCOUNTER — TELEPHONE (OUTPATIENT)
Dept: HEMATOLOGY ONCOLOGY | Facility: CLINIC | Age: 41
End: 2021-11-10

## 2021-11-12 DIAGNOSIS — C84.48 PERIPHERAL T-CELL LYMPHOMA OF LYMPH NODES OF MULTIPLE REGIONS (HCC): ICD-10-CM

## 2021-11-15 RX ORDER — ALLOPURINOL 300 MG/1
TABLET ORAL
Qty: 90 TABLET | Refills: 1 | Status: SHIPPED | OUTPATIENT
Start: 2021-11-15

## 2021-11-18 ENCOUNTER — APPOINTMENT (OUTPATIENT)
Dept: LAB | Facility: HOSPITAL | Age: 41
End: 2021-11-18
Payer: COMMERCIAL

## 2021-11-18 DIAGNOSIS — C84.48 PERIPHERAL T-CELL LYMPHOMA OF LYMPH NODES OF MULTIPLE REGIONS (HCC): ICD-10-CM

## 2021-11-18 DIAGNOSIS — D75.839 THROMBOCYTOSIS: ICD-10-CM

## 2021-11-18 LAB
ALBUMIN SERPL BCP-MCNC: 3.8 G/DL (ref 3.5–5)
ALP SERPL-CCNC: 134 U/L (ref 46–116)
ALT SERPL W P-5'-P-CCNC: 47 U/L (ref 12–78)
ANION GAP SERPL CALCULATED.3IONS-SCNC: 4 MMOL/L (ref 4–13)
AST SERPL W P-5'-P-CCNC: 31 U/L (ref 5–45)
BASOPHILS # BLD AUTO: 0.1 THOUSANDS/ΜL (ref 0–0.1)
BASOPHILS NFR BLD AUTO: 1 % (ref 0–1)
BILIRUB SERPL-MCNC: 0.39 MG/DL (ref 0.2–1)
BUN SERPL-MCNC: 6 MG/DL (ref 5–25)
CALCIUM SERPL-MCNC: 9.2 MG/DL (ref 8.3–10.1)
CHLORIDE SERPL-SCNC: 106 MMOL/L (ref 100–108)
CO2 SERPL-SCNC: 29 MMOL/L (ref 21–32)
CREAT SERPL-MCNC: 0.85 MG/DL (ref 0.6–1.3)
EOSINOPHIL # BLD AUTO: 0.03 THOUSAND/ΜL (ref 0–0.61)
EOSINOPHIL NFR BLD AUTO: 0 % (ref 0–6)
ERYTHROCYTE [DISTWIDTH] IN BLOOD BY AUTOMATED COUNT: 14.3 % (ref 11.6–15.1)
GFR SERPL CREATININE-BSD FRML MDRD: 109 ML/MIN/1.73SQ M
GLUCOSE SERPL-MCNC: 96 MG/DL (ref 65–140)
HCT VFR BLD AUTO: 37.2 % (ref 36.5–49.3)
HGB BLD-MCNC: 12.6 G/DL (ref 12–17)
IMM GRANULOCYTES # BLD AUTO: 0.12 THOUSAND/UL (ref 0–0.2)
IMM GRANULOCYTES NFR BLD AUTO: 1 % (ref 0–2)
LYMPHOCYTES # BLD AUTO: 1.41 THOUSANDS/ΜL (ref 0.6–4.47)
LYMPHOCYTES NFR BLD AUTO: 15 % (ref 14–44)
MCH RBC QN AUTO: 33.9 PG (ref 26.8–34.3)
MCHC RBC AUTO-ENTMCNC: 33.9 G/DL (ref 31.4–37.4)
MCV RBC AUTO: 100 FL (ref 82–98)
MONOCYTES # BLD AUTO: 0.82 THOUSAND/ΜL (ref 0.17–1.22)
MONOCYTES NFR BLD AUTO: 8 % (ref 4–12)
NEUTROPHILS # BLD AUTO: 7.23 THOUSANDS/ΜL (ref 1.85–7.62)
NEUTS SEG NFR BLD AUTO: 75 % (ref 43–75)
NRBC BLD AUTO-RTO: 0 /100 WBCS
PLATELET # BLD AUTO: 297 THOUSANDS/UL (ref 149–390)
PMV BLD AUTO: 9.2 FL (ref 8.9–12.7)
POTASSIUM SERPL-SCNC: 4.1 MMOL/L (ref 3.5–5.3)
PROT SERPL-MCNC: 6.8 G/DL (ref 6.4–8.2)
RBC # BLD AUTO: 3.72 MILLION/UL (ref 3.88–5.62)
SODIUM SERPL-SCNC: 139 MMOL/L (ref 136–145)
WBC # BLD AUTO: 9.71 THOUSAND/UL (ref 4.31–10.16)

## 2021-11-18 PROCEDURE — 36415 COLL VENOUS BLD VENIPUNCTURE: CPT

## 2021-11-18 PROCEDURE — 85025 COMPLETE CBC W/AUTO DIFF WBC: CPT

## 2021-11-18 PROCEDURE — 80053 COMPREHEN METABOLIC PANEL: CPT

## 2021-11-19 ENCOUNTER — HOSPITAL ENCOUNTER (OUTPATIENT)
Dept: NUCLEAR MEDICINE | Facility: HOSPITAL | Age: 41
Discharge: HOME | End: 2021-11-19
Payer: COMMERCIAL

## 2021-11-19 DIAGNOSIS — C84.48 PERIPHERAL T-CELL LYMPHOMA OF LYMPH NODES OF MULTIPLE REGIONS (HCC): ICD-10-CM

## 2021-11-19 LAB — GLUCOSE SERPL-MCNC: 98 MG/DL (ref 65–140)

## 2021-11-19 PROCEDURE — G1004 CDSM NDSC: HCPCS

## 2021-11-19 PROCEDURE — 78815 PET IMAGE W/CT SKULL-THIGH: CPT

## 2021-11-19 PROCEDURE — 82948 REAGENT STRIP/BLOOD GLUCOSE: CPT

## 2021-11-19 PROCEDURE — A9552 F18 FDG: HCPCS

## 2021-11-22 ENCOUNTER — OFFICE VISIT (OUTPATIENT)
Dept: HEMATOLOGY ONCOLOGY | Facility: CLINIC | Age: 41
End: 2021-11-22
Payer: COMMERCIAL

## 2021-11-22 VITALS
OXYGEN SATURATION: 98 % | BODY MASS INDEX: 28.14 KG/M2 | WEIGHT: 190 LBS | HEART RATE: 95 BPM | RESPIRATION RATE: 16 BRPM | SYSTOLIC BLOOD PRESSURE: 120 MMHG | TEMPERATURE: 98.4 F | HEIGHT: 69 IN | DIASTOLIC BLOOD PRESSURE: 76 MMHG

## 2021-11-22 DIAGNOSIS — R21 RASH: ICD-10-CM

## 2021-11-22 DIAGNOSIS — C84.48 PERIPHERAL T-CELL LYMPHOMA OF LYMPH NODES OF MULTIPLE REGIONS (HCC): Primary | ICD-10-CM

## 2021-11-22 PROCEDURE — 99214 OFFICE O/P EST MOD 30 MIN: CPT | Performed by: INTERNAL MEDICINE

## 2021-12-01 ENCOUNTER — PATIENT OUTREACH (OUTPATIENT)
Dept: HEMATOLOGY ONCOLOGY | Facility: CLINIC | Age: 41
End: 2021-12-01

## 2022-01-06 ENCOUNTER — PATIENT OUTREACH (OUTPATIENT)
Dept: HEMATOLOGY ONCOLOGY | Facility: CLINIC | Age: 42
End: 2022-01-06

## 2022-01-06 NOTE — PROGRESS NOTES
MSW made outreach to the pt's mother to assess how the pt has been doing since his transplant  The pt's mother explained that the pt was discharged to home on Sunday, 1/2 and she was at the pt's home  She placed him on speaker phone and they both joined in the conversation  The pt sounded well and shared the events of his transplant and his time in the hospital and since he has been at home  The pt described feeling lethargic, but overall was pleased to be at home  He shared that the Doctor was pleased at how well he had done through all of his treatment  His mom will be helping him as needed and will be getting his groceries  The pt hopes to be able to regain his strength and plans to remain at home to protect himself  Pt reports no needs at this time  Emotional support was offered and pt was encouraged to call as needed  MSW will continue to follow

## 2022-01-07 ENCOUNTER — PATIENT OUTREACH (OUTPATIENT)
Dept: HEMATOLOGY ONCOLOGY | Facility: CLINIC | Age: 42
End: 2022-01-07

## 2022-01-08 NOTE — PROGRESS NOTES
MSW received call from pt this day stating he was attempting to apply for a kayla through the [address] [place] [address] and he explained that the application needs to be submitted by a clinical member of the team   MSW went to the website while on the phone with the pt and submitted the request on his behalf  Upon completing the application, the final approval for the kayla will not be given until they contact the pt's oncologist   MSW provided this information in the application and explained this to the pt  MSW will follow up with pt when notification is received form the [employer]  Pt verbalized appreciation for assistance with this application

## 2022-02-10 ENCOUNTER — OFFICE VISIT (OUTPATIENT)
Dept: HEMATOLOGY ONCOLOGY | Facility: CLINIC | Age: 42
End: 2022-02-10
Payer: COMMERCIAL

## 2022-02-10 VITALS
OXYGEN SATURATION: 98 % | SYSTOLIC BLOOD PRESSURE: 118 MMHG | RESPIRATION RATE: 16 BRPM | TEMPERATURE: 98.6 F | HEIGHT: 69 IN | BODY MASS INDEX: 29.18 KG/M2 | DIASTOLIC BLOOD PRESSURE: 60 MMHG | HEART RATE: 85 BPM | WEIGHT: 197 LBS

## 2022-02-10 DIAGNOSIS — C84.48 PERIPHERAL T-CELL LYMPHOMA OF LYMPH NODES OF MULTIPLE REGIONS (HCC): Primary | ICD-10-CM

## 2022-02-10 PROCEDURE — 99213 OFFICE O/P EST LOW 20 MIN: CPT | Performed by: INTERNAL MEDICINE

## 2022-02-10 RX ORDER — ACYCLOVIR 800 MG/1
TABLET ORAL
COMMUNITY
Start: 2022-02-01

## 2022-02-10 NOTE — PROGRESS NOTES
[facility] HEMATOLOGY ONCOLOGY SPECIALISTS [place]  [address] [facility] [address]  [phone]  [address] [facility] Carlos Alberto Snider [id number]  02/10/22    Discussion:   In summary, this is a 81-year-old male history of peripheral T-cell lymphoma as outlined  He is status post high-dose chemotherapy with autologous stem cell support, December 20, 2021  He was in the hospital for about 2 5 weeks  He had febrile neutropenia and diarrhea, C diff negative  Since discharge she has gradually been improving appetite, energy level, etcetera  He has follow-up arranged in a few weeks with repeat blood work and imaging  I would assume that further imaging could be carried out locally  I made arrangements to see him back in 3 months for review  I discussed the above with the patient  The patient  voiced understanding and agreement   ______________________________________________________________________    Chief Complaint   Patient presents with    Follow-up       HPI:  Oncology History   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 ([facility] [address] )   5/28/2021 Initial Diagnosis    Patient had COVID vaccination in early April, 2nd injection in early May  A week or 2 later he developed substantial bilateral cervical adenopathy  May 28, 2021 CT of the neck showed diffuse bilateral cervical adenopathy measuring up to 2 5 cm on the right  6/17/2021 Biopsy    Right neck lymph node, Biopsy:     - Peripheral T-cell lymphoma, not otherwise specified, see note       7/19/2021 - 11/3/2021 Chemotherapy    DOXOrubicin (ADRIAMYCIN), 101 mg, Intravenous, Once, 6 of 6 cycles  Administration: 100 mg (7/19/2021), 101 mg (8/9/2021), 101 mg (8/30/2021), 101 mg (9/20/2021), 101 mg (10/12/2021), 101 mg (11/1/2021)  pegfilgrastim (NEULASTA), 6 mg, Subcutaneous, Once, 1 of 1 cycle  Administration: 6 mg (11/3/2021)  pegfilgrastim (NEULASTA ONPRO), 6 mg, Subcutaneous, Once, 5 of 5 cycles  Administration: 6 mg (8/9/2021), 6 mg (8/30/2021), 6 mg (9/20/2021), 6 mg (10/12/2021), 6 mg (11/1/2021)  cyclophosphamide (CYTOXAN) IVPB, 1,516 mg, Intravenous, Once, 6 of 6 cycles  Administration: 1,500 mg (7/19/2021), 1,500 mg (8/9/2021), 1,500 mg (8/30/2021), 1,500 mg (9/20/2021), 1,500 mg (10/12/2021), 1,500 mg (11/1/2021)  fosaprepitant (EMEND) IVPB, 150 mg, Intravenous, Once, 6 of 6 cycles  Administration: 150 mg (7/19/2021), 150 mg (8/9/2021), 150 mg (8/30/2021), 150 mg (9/20/2021), 150 mg (10/12/2021), 150 mg (11/1/2021)  brentuximab (ADCETRIS) IVPB, 154 5 mg, Intravenous, Once, 6 of 6 cycles  Administration: 150 mg (8/9/2021), 150 mg (8/30/2021), 150 mg (9/20/2021), 150 mg (10/12/2021), 150 mg (11/1/2021), 150 mg (7/23/2021)     7/23/2021 -  Cancer Staged    July 23, 2021 PET-CT showed marked decrease in the size of cervical and mediastinal lymph nodes compared to prior CT scan of May 28th 2021  Abdominal pelvic lymph nodes measure para aortic nodes up to 1 5 cm  Inguinal nodes up to 2 4 cm and a lucent defect with sclerotic margins in the left femoral neck measuring 1 8 cm  Although these lesions showed SUV of 2 1 max  Interval History:  Clinically stable  ECOG-  1 - Symptomatic but completely ambulatory    Review of Systems   Constitutional: Positive for fatigue  Negative for chills and fever  HENT: Negative for nosebleeds  Eyes: Negative for discharge  Respiratory: Negative for cough and shortness of breath  Cardiovascular: Negative for chest pain  Gastrointestinal: Negative for abdominal pain, constipation and diarrhea  Endocrine: Negative for polydipsia  Genitourinary: Negative for hematuria  Musculoskeletal: Negative for arthralgias  Skin: Negative for color change  Allergic/Immunologic: Negative for immunocompromised state  Neurological: Negative for dizziness and headaches  Hematological: Negative for adenopathy     Psychiatric/Behavioral: Negative for agitation  Past Medical History:   Diagnosis Date    Interstitial keratitis     Lymphoma, peripheral T-cell ([facility] [address] )      Patient Active Problem List   Diagnosis    Lymphadenopathy    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 (HCC)    Rash    Eosinophilia    Chemotherapy induced neutropenia (HCC)    Thrombocytosis    Neoplastic malignant related fatigue    Anxiety disorder due to medical condition       Current Outpatient Medications:     acyclovir (ZOVIRAX) 800 mg tablet, TAKE 1 TABLET (800 MG TOTAL) BY MOUTH 2 TIMES DAILY  DAYS, Disp: , Rfl:     Multiple Vitamin (Multi-Day Vitamins) TABS, Take 1 tablet by mouth daily, Disp: , Rfl:     Omega-3 Fatty Acids (fish oil) 1,000 mg, Take 1,000 mg by mouth daily, Disp: , Rfl:     allopurinol (ZYLOPRIM) 300 mg tablet, TAKE 1 TABLET BY MOUTH EVERY DAY (Patient not taking: Reported on 2/10/2022), Disp: 90 tablet, Rfl: 1    ondansetron (ZOFRAN) 8 mg tablet, Take 1 tablet (8 mg total) by mouth every 8 (eight) hours as needed for nausea or vomiting (Patient not taking: Reported on 2/10/2022 ), Disp: 30 tablet, Rfl: 3    predniSONE 50 mg tablet, Take 2 tabs by mouth each day with breakfast starting the day after chemotherapy   (Patient not taking: Reported on 2/10/2022 ), Disp: 10 tablet, Rfl: 5  No Known Allergies  Past Surgical History:   Procedure Laterality Date    IR PORT PLACEMENT  7/13/2021    LYMPH NODE BIOPSY Right 6/17/2021    Procedure: EXCISIONAL BIOPSY OF RIGHT NECK LYMPH NODE WITH LYMPHOMA PROTOCOL;  Surgeon: Garret Joseph MD;  [facility]: [facility];  Service: Surgical Oncology    WISDOM TOOTH EXTRACTION       Social History     Objective:  Vitals:    02/10/22 1338   BP: 118/60   BP Location: Left arm   Patient Position: Sitting   Cuff Size: Adult   Pulse: 85   Resp: 16   Temp: 98 6 °F (37 °C)   TempSrc: Temporal   SpO2: 98%   Weight: 89 4 kg (197 lb)   Height: 5' 9 02" (1 753 m)     Physical Exam  Constitutional: Appearance: He is well-developed  HENT:      Head: Normocephalic and atraumatic  Eyes:      Pupils: Pupils are equal, round, and reactive to light  Cardiovascular:      Rate and Rhythm: Normal rate and regular rhythm  Heart sounds: No murmur heard  Pulmonary:      Breath sounds: Normal breath sounds  No wheezing or rales  Abdominal:      Palpations: Abdomen is soft  Tenderness: There is no abdominal tenderness  Musculoskeletal:         General: No tenderness  Normal range of motion  Cervical back: Neck supple  Lymphadenopathy:      Cervical: No cervical adenopathy  Skin:     Findings: No erythema or rash  Neurological:      Mental Status: He is alert and oriented to person, place, and time  Cranial Nerves: No cranial nerve deficit  Deep Tendon Reflexes: Reflexes are normal and symmetric  Psychiatric:         Behavior: Behavior normal            Labs: I personally reviewed the labs and imaging pertinent to this patient care

## 2022-02-11 ENCOUNTER — HOSPITAL ENCOUNTER (OUTPATIENT)
Dept: INFUSION CENTER | Facility: HOSPITAL | Age: 42
Discharge: HOME | End: 2022-02-11
Payer: COMMERCIAL

## 2022-02-11 VITALS — TEMPERATURE: 96 F

## 2022-02-11 DIAGNOSIS — C84.48 PERIPHERAL T-CELL LYMPHOMA OF LYMPH NODES OF MULTIPLE REGIONS (HCC): ICD-10-CM

## 2022-02-11 LAB
ALBUMIN SERPL BCP-MCNC: 4 G/DL (ref 3.5–5)
ALP SERPL-CCNC: 156 U/L (ref 46–116)
ALT SERPL W P-5'-P-CCNC: 63 U/L (ref 12–78)
ANION GAP SERPL CALCULATED.3IONS-SCNC: 6 MMOL/L (ref 4–13)
AST SERPL W P-5'-P-CCNC: 38 U/L (ref 5–45)
BASOPHILS # BLD AUTO: 0.07 THOUSANDS/ΜL (ref 0–0.1)
BASOPHILS NFR BLD AUTO: 1 % (ref 0–1)
BILIRUB SERPL-MCNC: 0.59 MG/DL (ref 0.2–1)
BUN SERPL-MCNC: 9 MG/DL (ref 5–25)
CALCIUM SERPL-MCNC: 9.8 MG/DL (ref 8.3–10.1)
CHLORIDE SERPL-SCNC: 106 MMOL/L (ref 100–108)
CO2 SERPL-SCNC: 26 MMOL/L (ref 21–32)
CREAT SERPL-MCNC: 0.73 MG/DL (ref 0.6–1.3)
EOSINOPHIL # BLD AUTO: 0.18 THOUSAND/ΜL (ref 0–0.61)
EOSINOPHIL NFR BLD AUTO: 3 % (ref 0–6)
ERYTHROCYTE [DISTWIDTH] IN BLOOD BY AUTOMATED COUNT: 16.3 % (ref 11.6–15.1)
GFR SERPL CREATININE-BSD FRML MDRD: 116 ML/MIN/1.73SQ M
GLUCOSE SERPL-MCNC: 97 MG/DL (ref 65–140)
HCT VFR BLD AUTO: 37.2 % (ref 36.5–49.3)
HGB BLD-MCNC: 12.7 G/DL (ref 12–17)
IMM GRANULOCYTES # BLD AUTO: 0.01 THOUSAND/UL (ref 0–0.2)
IMM GRANULOCYTES NFR BLD AUTO: 0 % (ref 0–2)
LYMPHOCYTES # BLD AUTO: 2.77 THOUSANDS/ΜL (ref 0.6–4.47)
LYMPHOCYTES NFR BLD AUTO: 40 % (ref 14–44)
MCH RBC QN AUTO: 33.8 PG (ref 26.8–34.3)
MCHC RBC AUTO-ENTMCNC: 34.1 G/DL (ref 31.4–37.4)
MCV RBC AUTO: 99 FL (ref 82–98)
MONOCYTES # BLD AUTO: 0.87 THOUSAND/ΜL (ref 0.17–1.22)
MONOCYTES NFR BLD AUTO: 13 % (ref 4–12)
NEUTROPHILS # BLD AUTO: 3 THOUSANDS/ΜL (ref 1.85–7.62)
NEUTS SEG NFR BLD AUTO: 43 % (ref 43–75)
NRBC BLD AUTO-RTO: 0 /100 WBCS
PLATELET # BLD AUTO: 207 THOUSANDS/UL (ref 149–390)
PMV BLD AUTO: 8.8 FL (ref 8.9–12.7)
POTASSIUM SERPL-SCNC: 4.1 MMOL/L (ref 3.5–5.3)
PROT SERPL-MCNC: 6.8 G/DL (ref 6.4–8.2)
RBC # BLD AUTO: 3.76 MILLION/UL (ref 3.88–5.62)
SODIUM SERPL-SCNC: 138 MMOL/L (ref 136–145)
WBC # BLD AUTO: 6.9 THOUSAND/UL (ref 4.31–10.16)

## 2022-02-11 PROCEDURE — 80053 COMPREHEN METABOLIC PANEL: CPT

## 2022-02-11 PROCEDURE — 85025 COMPLETE CBC W/AUTO DIFF WBC: CPT

## 2022-02-16 ENCOUNTER — DOCUMENTATION (OUTPATIENT)
Dept: NEUROSURGERY | Facility: CLINIC | Age: 42
End: 2022-02-16

## 2022-02-16 NOTE — PROGRESS NOTES
Patient's case was discussed this morning at the Neuro Oncology Case Review as requested by Dr Sheba Wheatley  Patient's MRI of the brain was dicussed and after review the group agreed with Dr Myron Gil recommendation of a follow up scan in 3 months  Dr Sheba Wheatley was made aware of the above recommendations  The final treatment plan will be left at the discretion of the patient and the treating physician  DISCLAIMERS:  TO THE TREATING PHYSICIAN:  This conference is a meeting of clinicians from various specialty areas who evaluate and discuss patients for whom a multidisciplinary treatment approach is being considered  Please note that the above opinion was a consensus of the conference attendees and is intended only to assist in quality care of the discussed patient  The responsibility for follow up on the input given during the conference, along with any final decisions regarding plan of care, is that of the patient and the patient's provider  Accordingly, appointments have only been recommended based on this information; and have NOT been scheduled unless otherwise noted  TO THE PATIENT:  This summary is a brief record of major aspects of your cancer treatment  You may choose to can share a your copy with any of your doctors or nurses  However, this is not a detailed or comprehensive record of your care

## 2022-04-02 ENCOUNTER — IMMUNIZATIONS (OUTPATIENT)
Dept: FAMILY MEDICINE CLINIC | Facility: HOSPITAL | Age: 42
End: 2022-04-02

## 2022-04-02 PROCEDURE — 91305 COVID-19 PFIZER VACC TRIS-SUCROSE GRAY CAP 0.3 ML: CPT

## 2022-04-02 PROCEDURE — 0051A COVID-19 PFIZER VACC TRIS-SUCROSE GRAY CAP 0.3 ML: CPT

## 2022-04-06 ENCOUNTER — PATIENT OUTREACH (OUTPATIENT)
Dept: HEMATOLOGY ONCOLOGY | Facility: CLINIC | Age: 42
End: 2022-04-06

## 2022-04-06 NOTE — PROGRESS NOTES
MSW made follow up call to pt to assess any financial needs and to follow up with the [facility] that he had applied for a few months prior  MSW received the pt's voicemail and a detailed message was left, along with a contact number ant the pt was encouraged to call with any questions  Pt has not made any outreach attempts and MSW will close this episode but will be available ongoing for the pt as needed

## 2022-04-23 ENCOUNTER — IMMUNIZATIONS (OUTPATIENT)
Dept: FAMILY MEDICINE CLINIC | Facility: HOSPITAL | Age: 42
End: 2022-04-23

## 2022-04-23 PROCEDURE — 0054A COVID-19 PFIZER VACC TRIS-SUCROSE GRAY CAP 0.3 ML: CPT

## 2022-04-23 PROCEDURE — 91305 COVID-19 PFIZER VACC TRIS-SUCROSE GRAY CAP 0.3 ML: CPT

## 2022-05-02 ENCOUNTER — HOSPITAL ENCOUNTER (OUTPATIENT)
Dept: INFUSION CENTER | Facility: HOSPITAL | Age: 42
Discharge: HOME | End: 2022-05-02
Payer: COMMERCIAL

## 2022-05-02 DIAGNOSIS — C84.48 PERIPHERAL T-CELL LYMPHOMA OF LYMPH NODES OF MULTIPLE REGIONS (HCC): Primary | ICD-10-CM

## 2022-05-02 PROCEDURE — 96523 IRRIG DRUG DELIVERY DEVICE: CPT

## 2022-05-02 NOTE — PROGRESS NOTES
Pt arrived on unit for port flush  Port flushed, blood return noted, port flushed & deaccessed  Pt aware of next appt , AVS declined  Spoke with mom. Informed her of Arielle's approval as stated below. Per mom, Mamta is not walking but she would still like her to be seen for speech. Writer faxed referrals for PT, OT, and SLP just in case mom changes her mind to [facility]. Mother given contact information to schedule an apt. Should call Arielle with any questions regarding referral. Mother verbalized understanding.

## 2022-05-12 ENCOUNTER — OFFICE VISIT (OUTPATIENT)
Dept: HEMATOLOGY ONCOLOGY | Facility: CLINIC | Age: 42
End: 2022-05-12
Payer: COMMERCIAL

## 2022-05-12 ENCOUNTER — APPOINTMENT (OUTPATIENT)
Dept: LAB | Facility: HOSPITAL | Age: 42
End: 2022-05-12
Attending: INTERNAL MEDICINE
Payer: COMMERCIAL

## 2022-05-12 VITALS
RESPIRATION RATE: 17 BRPM | SYSTOLIC BLOOD PRESSURE: 110 MMHG | DIASTOLIC BLOOD PRESSURE: 72 MMHG | OXYGEN SATURATION: 97 % | TEMPERATURE: 98 F | HEIGHT: 69 IN | HEART RATE: 95 BPM | BODY MASS INDEX: 29.62 KG/M2 | WEIGHT: 200 LBS

## 2022-05-12 DIAGNOSIS — C84.48 PERIPHERAL T-CELL LYMPHOMA OF LYMPH NODES OF MULTIPLE REGIONS (HCC): ICD-10-CM

## 2022-05-12 DIAGNOSIS — C84.48 PERIPHERAL T-CELL LYMPHOMA OF LYMPH NODES OF MULTIPLE REGIONS (HCC): Primary | ICD-10-CM

## 2022-05-12 LAB
ALBUMIN SERPL BCP-MCNC: 4.2 G/DL (ref 3.5–5)
ALP SERPL-CCNC: 119 U/L (ref 46–116)
ALT SERPL W P-5'-P-CCNC: 42 U/L (ref 12–78)
ANION GAP SERPL CALCULATED.3IONS-SCNC: 2 MMOL/L (ref 4–13)
AST SERPL W P-5'-P-CCNC: 26 U/L (ref 5–45)
BASOPHILS # BLD AUTO: 0.04 THOUSANDS/ΜL (ref 0–0.1)
BASOPHILS NFR BLD AUTO: 1 % (ref 0–1)
BILIRUB SERPL-MCNC: 0.74 MG/DL (ref 0.2–1)
BUN SERPL-MCNC: 7 MG/DL (ref 5–25)
CALCIUM SERPL-MCNC: 10 MG/DL (ref 8.3–10.1)
CHLORIDE SERPL-SCNC: 105 MMOL/L (ref 100–108)
CO2 SERPL-SCNC: 30 MMOL/L (ref 21–32)
CREAT SERPL-MCNC: 0.94 MG/DL (ref 0.6–1.3)
EOSINOPHIL # BLD AUTO: 0.1 THOUSAND/ΜL (ref 0–0.61)
EOSINOPHIL NFR BLD AUTO: 1 % (ref 0–6)
ERYTHROCYTE [DISTWIDTH] IN BLOOD BY AUTOMATED COUNT: 12.6 % (ref 11.6–15.1)
GFR SERPL CREATININE-BSD FRML MDRD: 101 ML/MIN/1.73SQ M
GLUCOSE SERPL-MCNC: 104 MG/DL (ref 65–140)
HCT VFR BLD AUTO: 41.6 % (ref 36.5–49.3)
HGB BLD-MCNC: 14.4 G/DL (ref 12–17)
IMM GRANULOCYTES # BLD AUTO: 0.01 THOUSAND/UL (ref 0–0.2)
IMM GRANULOCYTES NFR BLD AUTO: 0 % (ref 0–2)
LDH SERPL-CCNC: 238 U/L (ref 81–234)
LYMPHOCYTES # BLD AUTO: 2.04 THOUSANDS/ΜL (ref 0.6–4.47)
LYMPHOCYTES NFR BLD AUTO: 27 % (ref 14–44)
MCH RBC QN AUTO: 33 PG (ref 26.8–34.3)
MCHC RBC AUTO-ENTMCNC: 34.6 G/DL (ref 31.4–37.4)
MCV RBC AUTO: 95 FL (ref 82–98)
MONOCYTES # BLD AUTO: 0.57 THOUSAND/ΜL (ref 0.17–1.22)
MONOCYTES NFR BLD AUTO: 7 % (ref 4–12)
NEUTROPHILS # BLD AUTO: 4.95 THOUSANDS/ΜL (ref 1.85–7.62)
NEUTS SEG NFR BLD AUTO: 64 % (ref 43–75)
NRBC BLD AUTO-RTO: 0 /100 WBCS
PLATELET # BLD AUTO: 243 THOUSANDS/UL (ref 149–390)
PMV BLD AUTO: 8.9 FL (ref 8.9–12.7)
POTASSIUM SERPL-SCNC: 4.6 MMOL/L (ref 3.5–5.3)
PROT SERPL-MCNC: 7.2 G/DL (ref 6.4–8.2)
RBC # BLD AUTO: 4.36 MILLION/UL (ref 3.88–5.62)
SODIUM SERPL-SCNC: 137 MMOL/L (ref 136–145)
WBC # BLD AUTO: 7.71 THOUSAND/UL (ref 4.31–10.16)

## 2022-05-12 PROCEDURE — 83615 LACTATE (LD) (LDH) ENZYME: CPT

## 2022-05-12 PROCEDURE — 99214 OFFICE O/P EST MOD 30 MIN: CPT | Performed by: INTERNAL MEDICINE

## 2022-05-12 PROCEDURE — 80053 COMPREHEN METABOLIC PANEL: CPT

## 2022-05-12 PROCEDURE — 36415 COLL VENOUS BLD VENIPUNCTURE: CPT

## 2022-05-12 PROCEDURE — 85025 COMPLETE CBC W/AUTO DIFF WBC: CPT

## 2022-05-12 NOTE — PROGRESS NOTES
[facility] HEMATOLOGY ONCOLOGY SPECIALISTS [address]  [facility] [address]  [place] [address] [phone]  [address] [facility] Jose Reagan [id number]  05/12/22    Discussion:   In summary, this is a 44-year-old male history of peripheral T-cell lymphoma, NOS, status post high-dose chemotherapy clinically he is doing fairly well  He is able to walk several miles a day  He is contemplating returning to work at [address] in a few days  Most recent CBC 3 months ago showed a normal white count and platelets  Hemoglobin 12 5  CMP shows elevation of transaminases and alk-phos, stable since December 2021  He had only had occasional slight alk-phos elevation prior to that  PET-CT in March 2022 showed no evidence recurrent malignancy  IM suspicious that his liver enzyme abnormalities are secondary to the transplant procedure itself  They are not reflective of underlying lymphoma by PET-CT  Observation is favored at this time  We will be investigating insurance coverage for vaccines to be carried out locally  If not covered locally, He may have to return to the transplant center  Repeat lab work today  I discussed the above with the patient  The patient  voiced understanding and agreement   ______________________________________________________________________    Chief Complaint   Patient presents with    Follow-up       HPI:  Oncology History   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 ([facility] [address] )   5/28/2021 Initial Diagnosis    Patient had COVID vaccination in early April, 2nd injection in early May  A week or 2 later he developed substantial bilateral cervical adenopathy  May 28, 2021 CT of the neck showed diffuse bilateral cervical adenopathy measuring up to 2 5 cm on the right  6/17/2021 Biopsy    Right neck lymph node, Biopsy:     - Peripheral T-cell lymphoma, not otherwise specified, see note       7/19/2021 - 11/3/2021 Chemotherapy    DOXOrubicin (ADRIAMYCIN), 101 mg, Intravenous, Once, 6 of 6 cycles  Administration: 100 mg (7/19/2021), 101 mg (8/9/2021), 101 mg (8/30/2021), 101 mg (9/20/2021), 101 mg (10/12/2021), 101 mg (11/1/2021)  pegfilgrastim (NEULASTA), 6 mg, Subcutaneous, Once, 1 of 1 cycle  Administration: 6 mg (11/3/2021)  pegfilgrastim (Vickki Griselda), 6 mg, Subcutaneous, Once, 5 of 5 cycles  Administration: 6 mg (8/9/2021), 6 mg (8/30/2021), 6 mg (9/20/2021), 6 mg (10/12/2021), 6 mg (11/1/2021)  cyclophosphamide (CYTOXAN) IVPB, 1,516 mg, Intravenous, Once, 6 of 6 cycles  Administration: 1,500 mg (7/19/2021), 1,500 mg (8/9/2021), 1,500 mg (8/30/2021), 1,500 mg (9/20/2021), 1,500 mg (10/12/2021), 1,500 mg (11/1/2021)  fosaprepitant (EMEND) IVPB, 150 mg, Intravenous, Once, 6 of 6 cycles  Administration: 150 mg (7/19/2021), 150 mg (8/9/2021), 150 mg (8/30/2021), 150 mg (9/20/2021), 150 mg (10/12/2021), 150 mg (11/1/2021)  brentuximab (ADCETRIS) IVPB, 154 5 mg, Intravenous, Once, 6 of 6 cycles  Administration: 150 mg (8/9/2021), 150 mg (8/30/2021), 150 mg (9/20/2021), 150 mg (10/12/2021), 150 mg (11/1/2021), 150 mg (7/23/2021)     7/23/2021 -  Cancer Staged    July 23, 2021 PET-CT showed marked decrease in the size of cervical and mediastinal lymph nodes compared to prior CT scan of May 28th 2021  Abdominal pelvic lymph nodes measure para aortic nodes up to 1 5 cm  Inguinal nodes up to 2 4 cm and a lucent defect with sclerotic margins in the left femoral neck measuring 1 8 cm  Although these lesions showed SUV of 2 1 max  Interval History:  Clinically stable  ECOG-  0 - Asymptomatic    Review of Systems   Constitutional: Negative for chills and fever  HENT: Negative for nosebleeds  Eyes: Negative for discharge  Respiratory: Negative for cough and shortness of breath  Cardiovascular: Negative for chest pain  Gastrointestinal: Negative for abdominal pain, constipation and diarrhea  Endocrine: Negative for polydipsia  Genitourinary: Negative for hematuria  Musculoskeletal: Negative for arthralgias  Skin: Negative for color change  Allergic/Immunologic: Negative for immunocompromised state  Neurological: Negative for dizziness and headaches  Hematological: Negative for adenopathy  Psychiatric/Behavioral: Negative for agitation  Past Medical History:   Diagnosis Date    Interstitial keratitis     Lymphoma, peripheral T-cell ([facility] [address] )      Patient Active Problem List   Diagnosis    Lymphadenopathy    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 (HCC)    Rash    Eosinophilia    Chemotherapy induced neutropenia (HCC)    Thrombocytosis    Neoplastic malignant related fatigue    Anxiety disorder due to medical condition       Current Outpatient Medications:     acyclovir (ZOVIRAX) 800 mg tablet, TAKE 1 TABLET (800 MG TOTAL) BY MOUTH 2 TIMES DAILY  DAYS, Disp: , Rfl:     allopurinol (ZYLOPRIM) 300 mg tablet, TAKE 1 TABLET BY MOUTH EVERY DAY (Patient not taking: Reported on 2/10/2022), Disp: 90 tablet, Rfl: 1    Multiple Vitamin (Multi-Day Vitamins) TABS, Take 1 tablet by mouth daily, Disp: , Rfl:     Omega-3 Fatty Acids (fish oil) 1,000 mg, Take 1,000 mg by mouth daily, Disp: , Rfl:     ondansetron (ZOFRAN) 8 mg tablet, Take 1 tablet (8 mg total) by mouth every 8 (eight) hours as needed for nausea or vomiting (Patient not taking: Reported on 2/10/2022 ), Disp: 30 tablet, Rfl: 3    predniSONE 50 mg tablet, Take 2 tabs by mouth each day with breakfast starting the day after chemotherapy   (Patient not taking: Reported on 2/10/2022 ), Disp: 10 tablet, Rfl: 5  No Known Allergies  Past Surgical History:   Procedure Laterality Date    IR PORT PLACEMENT  7/13/2021    LYMPH NODE BIOPSY Right 6/17/2021    Procedure: EXCISIONAL BIOPSY OF RIGHT NECK LYMPH NODE WITH LYMPHOMA PROTOCOL;  Surgeon: Jean Paul Venegas MD;  [facility];  Service: Surgical Oncology    WISDOM TOOTH EXTRACTION Social History     Objective:  Vitals:    05/12/22 1256   Resp: 17   Weight: 90 7 kg (200 lb)   Height: 5' 9 02" (1 753 m)     Physical Exam  Constitutional:       Appearance: He is well-developed  HENT:      Head: Normocephalic and atraumatic  Eyes:      Pupils: Pupils are equal, round, and reactive to light  Cardiovascular:      Rate and Rhythm: Normal rate and regular rhythm  Heart sounds: No murmur heard  Pulmonary:      Breath sounds: Normal breath sounds  No wheezing or rales  Abdominal:      Palpations: Abdomen is soft  Tenderness: There is no abdominal tenderness  Musculoskeletal:         General: No tenderness  Normal range of motion  Cervical back: Neck supple  Lymphadenopathy:      Cervical: No cervical adenopathy  Skin:     Findings: No erythema or rash  Neurological:      Mental Status: He is alert and oriented to person, place, and time  Cranial Nerves: No cranial nerve deficit  Deep Tendon Reflexes: Reflexes are normal and symmetric  Psychiatric:         Behavior: Behavior normal            Labs: I personally reviewed the labs and imaging pertinent to this patient care

## 2022-05-20 ENCOUNTER — TELEPHONE (OUTPATIENT)
Dept: HEMATOLOGY ONCOLOGY | Facility: CLINIC | Age: 42
End: 2022-05-20

## 2022-05-20 DIAGNOSIS — D32.9 MENINGIOMA (HCC): Primary | ICD-10-CM

## 2022-05-20 NOTE — PROGRESS NOTES
Attempted to call without success. Left VM stating he can have his dental cleaning. Dr. Lore Gonzalez also stated he could also have an MRI.     MRI will be scheduled and pt will be called with the date and time by the .

## 2022-05-20 NOTE — TELEPHONE ENCOUNTER
Left  for patient to let him know that he needs a brain MRI and I scheduled it at [address] [facility] on [address] at 1:15pm  I left my direct teams number for him to call back with any questions/concerns

## 2022-05-20 NOTE — TELEPHONE ENCOUNTER
Can you please schedule this patient for his MRI Head and call him with the date and time      Gretel Verma

## 2022-05-20 NOTE — TELEPHONE ENCOUNTER
----- Message from Ulus Libman, RN sent at 5/20/2022  9:57 AM EDT -----  Regarding: FW: [id number] Vaccination    ----- Message -----  From: Lesia Lui  Sent: 5/19/2022   3:46 PM EDT  To: Chandan Cisneros RN  Subject: COVID-19 Vaccination                             Do you know if Im cleared to have a dental cleaning? Also, I think we forgot to talk about the meningioma at my last appt  When do they think I should get a follow-up MRI?

## 2022-05-20 NOTE — TELEPHONE ENCOUNTER
So for MRIs they ask:    Does the pt have any metal in their body? Any injury to their eyes involving metal?  Ever been hit by bullets or shrapnel? Diabetic? High Blood Pressure Meds? Heart or Kidney disease/ tumor or transplant? [facility] maker, internal defibulator/ stimulation device or shunt? Port? Chemotherapy meds?   BUN/Creatinine in the past 6 weeks

## 2022-06-07 ENCOUNTER — TELEPHONE (OUTPATIENT)
Dept: HEMATOLOGY ONCOLOGY | Facility: CLINIC | Age: 42
End: 2022-06-07

## 2022-06-07 NOTE — TELEPHONE ENCOUNTER
Attempted to call pt regarding his concerns of recent wt loss, without success  Left VM with c/b # "Teams"  Awaiting c/b

## 2022-06-07 NOTE — TELEPHONE ENCOUNTER
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

## 2022-06-12 ENCOUNTER — HOSPITAL ENCOUNTER (OUTPATIENT)
Dept: MRI IMAGING | Facility: HOSPITAL | Age: 42
Discharge: HOME | End: 2022-06-12
Attending: INTERNAL MEDICINE
Payer: COMMERCIAL

## 2022-06-12 DIAGNOSIS — D32.9 MENINGIOMA (HCC): ICD-10-CM

## 2022-06-12 PROCEDURE — A9585 GADOBUTROL INJECTION: HCPCS | Performed by: INTERNAL MEDICINE

## 2022-06-12 PROCEDURE — G1004 CDSM NDSC: HCPCS

## 2022-06-12 PROCEDURE — 70553 MRI BRAIN STEM W/O & W/DYE: CPT

## 2022-06-12 RX ADMIN — GADOBUTROL 10 ML: 604.72 INJECTION INTRAVENOUS at 14:08

## 2022-06-13 ENCOUNTER — APPOINTMENT (OUTPATIENT)
Dept: LAB | Facility: HOSPITAL | Age: 42
End: 2022-06-13
Attending: INTERNAL MEDICINE
Payer: COMMERCIAL

## 2022-06-13 DIAGNOSIS — D32.9 MENINGIOMA (HCC): ICD-10-CM

## 2022-06-13 DIAGNOSIS — D32.9 MENINGIOMA (HCC): Primary | ICD-10-CM

## 2022-06-13 LAB
BASOPHILS # BLD AUTO: 0.02 THOUSANDS/ΜL (ref 0–0.1)
BASOPHILS NFR BLD AUTO: 0 % (ref 0–1)
EOSINOPHIL # BLD AUTO: 0.08 THOUSAND/ΜL (ref 0–0.61)
EOSINOPHIL NFR BLD AUTO: 1 % (ref 0–6)
ERYTHROCYTE [DISTWIDTH] IN BLOOD BY AUTOMATED COUNT: 12.9 % (ref 11.6–15.1)
FERRITIN SERPL-MCNC: 147 NG/ML (ref 8–388)
HCT VFR BLD AUTO: 42.9 % (ref 36.5–49.3)
HGB BLD-MCNC: 15.2 G/DL (ref 12–17)
IMM GRANULOCYTES # BLD AUTO: 0.02 THOUSAND/UL (ref 0–0.2)
IMM GRANULOCYTES NFR BLD AUTO: 0 % (ref 0–2)
IRON SATN MFR SERPL: 29 % (ref 20–50)
IRON SERPL-MCNC: 97 UG/DL (ref 65–175)
LYMPHOCYTES # BLD AUTO: 2.05 THOUSANDS/ΜL (ref 0.6–4.47)
LYMPHOCYTES NFR BLD AUTO: 27 % (ref 14–44)
MCH RBC QN AUTO: 33.9 PG (ref 26.8–34.3)
MCHC RBC AUTO-ENTMCNC: 35.4 G/DL (ref 31.4–37.4)
MCV RBC AUTO: 96 FL (ref 82–98)
MONOCYTES # BLD AUTO: 0.54 THOUSAND/ΜL (ref 0.17–1.22)
MONOCYTES NFR BLD AUTO: 7 % (ref 4–12)
NEUTROPHILS # BLD AUTO: 4.95 THOUSANDS/ΜL (ref 1.85–7.62)
NEUTS SEG NFR BLD AUTO: 65 % (ref 43–75)
NRBC BLD AUTO-RTO: 0 /100 WBCS
PLATELET # BLD AUTO: 260 THOUSANDS/UL (ref 149–390)
PMV BLD AUTO: 8.9 FL (ref 8.9–12.7)
RBC # BLD AUTO: 4.48 MILLION/UL (ref 3.88–5.62)
TIBC SERPL-MCNC: 332 UG/DL (ref 250–450)
VIT B12 SERPL-MCNC: 542 PG/ML (ref 100–900)
WBC # BLD AUTO: 7.66 THOUSAND/UL (ref 4.31–10.16)

## 2022-06-13 PROCEDURE — 82607 VITAMIN B-12: CPT

## 2022-06-13 PROCEDURE — 85025 COMPLETE CBC W/AUTO DIFF WBC: CPT

## 2022-06-13 PROCEDURE — 36415 COLL VENOUS BLD VENIPUNCTURE: CPT

## 2022-06-13 PROCEDURE — 83540 ASSAY OF IRON: CPT

## 2022-06-13 PROCEDURE — 82728 ASSAY OF FERRITIN: CPT

## 2022-06-13 PROCEDURE — 83550 IRON BINDING TEST: CPT

## 2022-08-25 ENCOUNTER — HOSPITAL ENCOUNTER (OUTPATIENT)
Dept: INFUSION CENTER | Facility: HOSPITAL | Age: 42
Discharge: HOME | End: 2022-08-25
Payer: COMMERCIAL

## 2022-08-25 DIAGNOSIS — C84.48 PERIPHERAL T-CELL LYMPHOMA OF LYMPH NODES OF MULTIPLE REGIONS (HCC): Primary | ICD-10-CM

## 2022-08-25 LAB
ALBUMIN SERPL BCP-MCNC: 3.9 G/DL (ref 3.5–5)
ALP SERPL-CCNC: 127 U/L (ref 46–116)
ALT SERPL W P-5'-P-CCNC: 36 U/L (ref 12–78)
ANION GAP SERPL CALCULATED.3IONS-SCNC: 3 MMOL/L (ref 4–13)
AST SERPL W P-5'-P-CCNC: 18 U/L (ref 5–45)
BASOPHILS # BLD AUTO: 0.07 THOUSANDS/ΜL (ref 0–0.1)
BASOPHILS NFR BLD AUTO: 1 % (ref 0–1)
BILIRUB SERPL-MCNC: 0.62 MG/DL (ref 0.2–1)
BUN SERPL-MCNC: 11 MG/DL (ref 5–25)
CALCIUM SERPL-MCNC: 9.2 MG/DL (ref 8.3–10.1)
CHLORIDE SERPL-SCNC: 108 MMOL/L (ref 96–108)
CO2 SERPL-SCNC: 28 MMOL/L (ref 21–32)
CREAT SERPL-MCNC: 0.84 MG/DL (ref 0.6–1.3)
EOSINOPHIL # BLD AUTO: 0.12 THOUSAND/ΜL (ref 0–0.61)
EOSINOPHIL NFR BLD AUTO: 2 % (ref 0–6)
ERYTHROCYTE [DISTWIDTH] IN BLOOD BY AUTOMATED COUNT: 12.2 % (ref 11.6–15.1)
GFR SERPL CREATININE-BSD FRML MDRD: 109 ML/MIN/1.73SQ M
GLUCOSE SERPL-MCNC: 99 MG/DL (ref 65–140)
HCT VFR BLD AUTO: 41.7 % (ref 36.5–49.3)
HGB BLD-MCNC: 14.6 G/DL (ref 12–17)
IMM GRANULOCYTES # BLD AUTO: 0.04 THOUSAND/UL (ref 0–0.2)
IMM GRANULOCYTES NFR BLD AUTO: 1 % (ref 0–2)
LYMPHOCYTES # BLD AUTO: 3.21 THOUSANDS/ΜL (ref 0.6–4.47)
LYMPHOCYTES NFR BLD AUTO: 41 % (ref 14–44)
MCH RBC QN AUTO: 33.7 PG (ref 26.8–34.3)
MCHC RBC AUTO-ENTMCNC: 35 G/DL (ref 31.4–37.4)
MCV RBC AUTO: 96 FL (ref 82–98)
MONOCYTES # BLD AUTO: 0.66 THOUSAND/ΜL (ref 0.17–1.22)
MONOCYTES NFR BLD AUTO: 9 % (ref 4–12)
NEUTROPHILS # BLD AUTO: 3.69 THOUSANDS/ΜL (ref 1.85–7.62)
NEUTS SEG NFR BLD AUTO: 46 % (ref 43–75)
NRBC BLD AUTO-RTO: 0 /100 WBCS
PLATELET # BLD AUTO: 228 THOUSANDS/UL (ref 149–390)
PMV BLD AUTO: 8.8 FL (ref 8.9–12.7)
POTASSIUM SERPL-SCNC: 4.1 MMOL/L (ref 3.5–5.3)
PROT SERPL-MCNC: 7.1 G/DL (ref 6.4–8.4)
RBC # BLD AUTO: 4.33 MILLION/UL (ref 3.88–5.62)
SODIUM SERPL-SCNC: 139 MMOL/L (ref 135–147)
WBC # BLD AUTO: 7.79 THOUSAND/UL (ref 4.31–10.16)

## 2022-08-25 PROCEDURE — 85025 COMPLETE CBC W/AUTO DIFF WBC: CPT

## 2022-08-25 PROCEDURE — 80053 COMPREHEN METABOLIC PANEL: CPT

## 2022-08-30 ENCOUNTER — PATIENT MESSAGE (OUTPATIENT)
Dept: HEMATOLOGY ONCOLOGY | Facility: CLINIC | Age: 42
End: 2022-08-30

## 2022-09-06 ENCOUNTER — HOSPITAL ENCOUNTER (OUTPATIENT)
Dept: RADIOLOGY | Facility: HOSPITAL | Age: 42
Discharge: HOME | End: 2022-09-06
Attending: INTERNAL MEDICINE
Payer: COMMERCIAL

## 2022-09-06 DIAGNOSIS — C84.48 PERIPHERAL T-CELL LYMPHOMA OF LYMPH NODES OF MULTIPLE REGIONS (HCC): ICD-10-CM

## 2022-09-06 PROCEDURE — 74177 CT ABD & PELVIS W/CONTRAST: CPT

## 2022-09-06 PROCEDURE — 71260 CT THORAX DX C+: CPT

## 2022-09-06 PROCEDURE — G1004 CDSM NDSC: HCPCS

## 2022-09-06 RX ADMIN — IOHEXOL 100 ML: 350 INJECTION, SOLUTION INTRAVENOUS at 09:16

## 2022-09-12 ENCOUNTER — OFFICE VISIT (OUTPATIENT)
Dept: HEMATOLOGY ONCOLOGY | Facility: CLINIC | Age: 42
End: 2022-09-12
Payer: COMMERCIAL

## 2022-09-12 VITALS
RESPIRATION RATE: 17 BRPM | OXYGEN SATURATION: 97 % | WEIGHT: 203 LBS | HEART RATE: 90 BPM | DIASTOLIC BLOOD PRESSURE: 80 MMHG | TEMPERATURE: 97.4 F | SYSTOLIC BLOOD PRESSURE: 124 MMHG | HEIGHT: 69 IN | BODY MASS INDEX: 30.07 KG/M2

## 2022-09-12 DIAGNOSIS — R74.8 ELEVATED ALKALINE PHOSPHATASE LEVEL: ICD-10-CM

## 2022-09-12 DIAGNOSIS — D32.9 MENINGIOMA (HCC): ICD-10-CM

## 2022-09-12 DIAGNOSIS — C84.48 PERIPHERAL T-CELL LYMPHOMA OF LYMPH NODES OF MULTIPLE REGIONS (HCC): Primary | ICD-10-CM

## 2022-09-12 PROBLEM — R59.1 LYMPHADENOPATHY: Status: RESOLVED | Noted: 2021-06-10 | Resolved: 2022-09-12

## 2022-09-12 PROBLEM — D75.839 THROMBOCYTOSIS: Status: RESOLVED | Noted: 2021-08-16 | Resolved: 2022-09-12

## 2022-09-12 PROCEDURE — 99214 OFFICE O/P EST MOD 30 MIN: CPT | Performed by: INTERNAL MEDICINE

## 2022-09-12 RX ORDER — PREDNISOLONE ACETATE 10 MG/ML
SUSPENSION/ DROPS OPHTHALMIC
COMMUNITY
Start: 2022-09-08

## 2022-09-12 NOTE — PROGRESS NOTES
[facility] [place]  [address] [facility] [address]  [phone]  [address] [facility] Christopher Sarmiento [phone]  09/12/22    Discussion:   In summary, this is a 80-year-old male history of peripheral T-cell lymphoma as outlined  He is status post high-dose chemotherapy, stem cell support in December 2021  He has gradually improving energy  He is working doing [employer] though less than full time  His endurance is gradually improving  Recent CBC and CMP are normal, save elevated alk-phos  Recent CT chest abdomen pelvis shows no specific abnormality to suggest recurrent lymphoma  Brain imaging in June showed stable meningioma  He has a lesion in the left femoral bone that is lytic with sclerotic border  This is been present dating back to his initial PET-CT in early 2021  I suspect that is the cause of his elevated alk-phos  Hepatic imaging has essentially been normal   I discussed the above with the patient  The patient  voiced understanding and agreement   ______________________________________________________________________    Chief Complaint   Patient presents with    Follow-up       HPI:  Oncology History   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 ([facility] [address] )   5/28/2021 Initial Diagnosis    Patient had COVID vaccination in early April, 2nd injection in early May  A week or 2 later he developed substantial bilateral cervical adenopathy  May 28, 2021 CT of the neck showed diffuse bilateral cervical adenopathy measuring up to 2 5 cm on the right  6/17/2021 Biopsy    Right neck lymph node, Biopsy:     - Peripheral T-cell lymphoma, not otherwise specified, see note       7/19/2021 - 11/3/2021 Chemotherapy    DOXOrubicin (ADRIAMYCIN), 101 mg, Intravenous, Once, 6 of 6 cycles  Administration: 100 mg (7/19/2021), 101 mg (8/9/2021), 101 mg (8/30/2021), 101 mg (9/20/2021), 101 mg (10/12/2021), 101 mg (11/1/2021)  pegfilgrastim (NEULASTA), 6 mg, Subcutaneous, Once, 1 of 1 cycle  Administration: 6 mg (11/3/2021)  pegfilgrastim (Larinda Mingle), 6 mg, Subcutaneous, Once, 5 of 5 cycles  Administration: 6 mg (8/9/2021), 6 mg (8/30/2021), 6 mg (9/20/2021), 6 mg (10/12/2021), 6 mg (11/1/2021)  cyclophosphamide (CYTOXAN) IVPB, 1,516 mg, Intravenous, Once, 6 of 6 cycles  Administration: 1,500 mg (7/19/2021), 1,500 mg (8/9/2021), 1,500 mg (8/30/2021), 1,500 mg (9/20/2021), 1,500 mg (10/12/2021), 1,500 mg (11/1/2021)  fosaprepitant (EMEND) IVPB, 150 mg, Intravenous, Once, 6 of 6 cycles  Administration: 150 mg (7/19/2021), 150 mg (8/9/2021), 150 mg (8/30/2021), 150 mg (9/20/2021), 150 mg (10/12/2021), 150 mg (11/1/2021)  brentuximab (ADCETRIS) IVPB, 154 5 mg, Intravenous, Once, 6 of 6 cycles  Administration: 150 mg (8/9/2021), 150 mg (8/30/2021), 150 mg (9/20/2021), 150 mg (10/12/2021), 150 mg (11/1/2021), 150 mg (7/23/2021)     7/23/2021 -  Cancer Staged    July 23, 2021 PET-CT showed marked decrease in the size of cervical and mediastinal lymph nodes compared to prior CT scan of May 28th 2021  Abdominal pelvic lymph nodes measure para aortic nodes up to 1 5 cm  Inguinal nodes up to 2 4 cm and a lucent defect with sclerotic margins in the left femoral neck measuring 1 8 cm  Although these lesions showed SUV of 2 1 max  Interval History:  Clinically stable  ECOG-  1 - Symptomatic but completely ambulatory    Review of Systems   Constitutional: Negative for chills and fever  HENT: Negative for nosebleeds  Eyes: Negative for discharge  Respiratory: Negative for cough and shortness of breath  Cardiovascular: Negative for chest pain  Gastrointestinal: Negative for abdominal pain, constipation and diarrhea  Endocrine: Negative for polydipsia  Genitourinary: Negative for hematuria  Musculoskeletal: Negative for arthralgias  Skin: Negative for color change     Allergic/Immunologic: Negative for immunocompromised state  Neurological: Negative for dizziness and headaches  Hematological: Negative for adenopathy  Psychiatric/Behavioral: Negative for agitation  Past Medical History:   Diagnosis Date    Interstitial keratitis     Lymphoma, peripheral T-cell ([facility] [address] )      Patient Active Problem List   Diagnosis    Lymphadenopathy    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 (HCC)    Rash    Eosinophilia    Chemotherapy induced neutropenia (HCC)    Thrombocytosis    Neoplastic malignant related fatigue    Anxiety disorder due to medical condition       Current Outpatient Medications:     acyclovir (ZOVIRAX) 800 mg tablet, TAKE 1 TABLET (800 MG TOTAL) BY MOUTH 2 TIMES DAILY  DAYS, Disp: , Rfl:     Multiple Vitamin (Multi-Day Vitamins) TABS, Take 1 tablet by mouth daily, Disp: , Rfl:     Omega-3 Fatty Acids (fish oil) 1,000 mg, Take 1,000 mg by mouth daily, Disp: , Rfl:     prednisoLONE acetate (PRED FORTE) 1 % ophthalmic suspension, ONE DROP LEFT EYE 4 TIMES A DAY, Disp: , Rfl:     allopurinol (ZYLOPRIM) 300 mg tablet, TAKE 1 TABLET BY MOUTH EVERY DAY (Patient not taking: No sig reported), Disp: 90 tablet, Rfl: 1    ondansetron (ZOFRAN) 8 mg tablet, Take 1 tablet (8 mg total) by mouth every 8 (eight) hours as needed for nausea or vomiting (Patient not taking: No sig reported), Disp: 30 tablet, Rfl: 3    predniSONE 50 mg tablet, Take 2 tabs by mouth each day with breakfast starting the day after chemotherapy   (Patient not taking: No sig reported), Disp: 10 tablet, Rfl: 5  No Known Allergies  Past Surgical History:   Procedure Laterality Date    IR PORT PLACEMENT  7/13/2021    LYMPH NODE BIOPSY Right 6/17/2021    Procedure: EXCISIONAL BIOPSY OF RIGHT NECK LYMPH NODE WITH LYMPHOMA PROTOCOL;  Surgeon: Toni Cruz MD;  [facility]: [facility];  Service: Surgical Oncology    WISDOM TOOTH EXTRACTION       Social History     Objective:  Vitals:    09/12/22 1354 BP: 124/80   BP Location: Left arm   Patient Position: Sitting   Cuff Size: Adult   Pulse: 90   Resp: 17   Temp: (!) 97 4 °F (36 3 °C)   SpO2: 97%   Weight: 92 1 kg (203 lb)   Height: 5' 9 02" (1 753 m)     Physical Exam  Constitutional:       Appearance: He is well-developed  HENT:      Head: Normocephalic and atraumatic  Eyes:      Pupils: Pupils are equal, round, and reactive to light  Cardiovascular:      Rate and Rhythm: Normal rate and regular rhythm  Heart sounds: No murmur heard  Pulmonary:      Breath sounds: Normal breath sounds  No wheezing or rales  Abdominal:      Palpations: Abdomen is soft  Tenderness: There is no abdominal tenderness  Musculoskeletal:         General: No tenderness  Normal range of motion  Cervical back: Neck supple  Lymphadenopathy:      Cervical: No cervical adenopathy  Skin:     Findings: No erythema or rash  Neurological:      Mental Status: He is alert and oriented to person, place, and time  Cranial Nerves: No cranial nerve deficit  Deep Tendon Reflexes: Reflexes are normal and symmetric  Psychiatric:         Behavior: Behavior normal            Labs: I personally reviewed the labs and imaging pertinent to this patient care

## 2022-09-21 ENCOUNTER — APPOINTMENT (OUTPATIENT)
Dept: LAB | Facility: CLINIC | Age: 42
End: 2022-09-21
Payer: COMMERCIAL

## 2022-09-21 DIAGNOSIS — E55.9 AVITAMINOSIS D: ICD-10-CM

## 2022-09-21 DIAGNOSIS — R53.83 FATIGUE, UNSPECIFIED TYPE: ICD-10-CM

## 2022-09-21 LAB
25(OH)D3 SERPL-MCNC: 21.3 NG/ML (ref 30–100)
FOLATE SERPL-MCNC: >20 NG/ML (ref 3.1–17.5)
T4 FREE SERPL-MCNC: 0.98 NG/DL (ref 0.76–1.46)
TSH SERPL DL<=0.05 MIU/L-ACNC: 1.24 UIU/ML (ref 0.45–4.5)
VIT B12 SERPL-MCNC: 487 PG/ML (ref 100–900)

## 2022-09-21 PROCEDURE — 84410 TESTOSTERONE BIOAVAILABLE: CPT

## 2022-09-21 PROCEDURE — 84443 ASSAY THYROID STIM HORMONE: CPT

## 2022-09-21 PROCEDURE — 84402 ASSAY OF FREE TESTOSTERONE: CPT

## 2022-09-21 PROCEDURE — 82607 VITAMIN B-12: CPT

## 2022-09-21 PROCEDURE — 82746 ASSAY OF FOLIC ACID SERUM: CPT

## 2022-09-21 PROCEDURE — 82306 VITAMIN D 25 HYDROXY: CPT

## 2022-09-21 PROCEDURE — 84439 ASSAY OF FREE THYROXINE: CPT

## 2022-09-21 PROCEDURE — 84403 ASSAY OF TOTAL TESTOSTERONE: CPT

## 2022-09-21 PROCEDURE — 84270 ASSAY OF SEX HORMONE GLOBUL: CPT

## 2022-09-21 PROCEDURE — 36415 COLL VENOUS BLD VENIPUNCTURE: CPT

## 2022-09-22 LAB
TESTOST FREE SERPL-MCNC: 7.1 PG/ML (ref 6.8–21.5)
TESTOST SERPL-MCNC: 294 NG/DL (ref 264–916)

## 2022-09-30 LAB
SHBG SERPL-SCNC: 32.3 NMOL/L
TESTOST SERPL-MCNC: 298 NG/DL
TESTOSTERONE.FREE+WB MFR SERPL: 35.7 %
TESTOSTERONE.FREE+WB SERPL-MCNC: 106 NG/DL

## 2022-12-27 ENCOUNTER — HOSPITAL ENCOUNTER (OUTPATIENT)
Dept: MRI IMAGING | Facility: HOSPITAL | Age: 42
Discharge: HOME | End: 2022-12-27
Attending: INTERNAL MEDICINE

## 2022-12-27 ENCOUNTER — HOSPITAL ENCOUNTER (OUTPATIENT)
Dept: CT IMAGING | Facility: HOSPITAL | Age: 42
Discharge: HOME | End: 2022-12-27
Attending: INTERNAL MEDICINE

## 2022-12-27 DIAGNOSIS — C84.48 PERIPHERAL T-CELL LYMPHOMA OF LYMPH NODES OF MULTIPLE REGIONS (HCC): ICD-10-CM

## 2022-12-27 DIAGNOSIS — D32.9 MENINGIOMA (HCC): ICD-10-CM

## 2022-12-27 RX ADMIN — IOHEXOL 100 ML: 350 INJECTION, SOLUTION INTRAVENOUS at 14:27

## 2022-12-27 RX ADMIN — GADOBUTROL 9 ML: 604.72 INJECTION INTRAVENOUS at 13:24

## 2023-01-06 ENCOUNTER — APPOINTMENT (OUTPATIENT)
Dept: LAB | Facility: CLINIC | Age: 43
End: 2023-01-06

## 2023-01-06 DIAGNOSIS — C84.48 PERIPHERAL T-CELL LYMPHOMA OF LYMPH NODES OF MULTIPLE REGIONS (HCC): ICD-10-CM

## 2023-01-06 LAB
ALBUMIN SERPL BCP-MCNC: 4.1 G/DL (ref 3.5–5)
ALP SERPL-CCNC: 121 U/L (ref 46–116)
ALT SERPL W P-5'-P-CCNC: 32 U/L (ref 12–78)
ANION GAP SERPL CALCULATED.3IONS-SCNC: 6 MMOL/L (ref 4–13)
AST SERPL W P-5'-P-CCNC: 22 U/L (ref 5–45)
BASOPHILS # BLD AUTO: 0.04 THOUSANDS/ÂΜL (ref 0–0.1)
BASOPHILS NFR BLD AUTO: 1 % (ref 0–1)
BILIRUB SERPL-MCNC: 0.54 MG/DL (ref 0.2–1)
BUN SERPL-MCNC: 12 MG/DL (ref 5–25)
CALCIUM SERPL-MCNC: 10.1 MG/DL (ref 8.3–10.1)
CHLORIDE SERPL-SCNC: 106 MMOL/L (ref 96–108)
CO2 SERPL-SCNC: 28 MMOL/L (ref 21–32)
CREAT SERPL-MCNC: 0.94 MG/DL (ref 0.6–1.3)
EOSINOPHIL # BLD AUTO: 0.15 THOUSAND/ÂΜL (ref 0–0.61)
EOSINOPHIL NFR BLD AUTO: 2 % (ref 0–6)
ERYTHROCYTE [DISTWIDTH] IN BLOOD BY AUTOMATED COUNT: 12.5 % (ref 11.6–15.1)
GFR SERPL CREATININE-BSD FRML MDRD: 100 ML/MIN/1.73SQ M
GLUCOSE SERPL-MCNC: 108 MG/DL (ref 65–140)
HCT VFR BLD AUTO: 45.6 % (ref 36.5–49.3)
HGB BLD-MCNC: 15.4 G/DL (ref 12–17)
IMM GRANULOCYTES # BLD AUTO: 0.01 THOUSAND/UL (ref 0–0.2)
IMM GRANULOCYTES NFR BLD AUTO: 0 % (ref 0–2)
LDH SERPL-CCNC: 233 U/L (ref 81–234)
LYMPHOCYTES # BLD AUTO: 3.39 THOUSANDS/ÂΜL (ref 0.6–4.47)
LYMPHOCYTES NFR BLD AUTO: 51 % (ref 14–44)
MCH RBC QN AUTO: 33.6 PG (ref 26.8–34.3)
MCHC RBC AUTO-ENTMCNC: 33.8 G/DL (ref 31.4–37.4)
MCV RBC AUTO: 99 FL (ref 82–98)
MONOCYTES # BLD AUTO: 0.54 THOUSAND/ÂΜL (ref 0.17–1.22)
MONOCYTES NFR BLD AUTO: 8 % (ref 4–12)
NEUTROPHILS # BLD AUTO: 2.49 THOUSANDS/ÂΜL (ref 1.85–7.62)
NEUTS SEG NFR BLD AUTO: 38 % (ref 43–75)
NRBC BLD AUTO-RTO: 0 /100 WBCS
PLATELET # BLD AUTO: 219 THOUSANDS/UL (ref 149–390)
PMV BLD AUTO: 9.1 FL (ref 8.9–12.7)
POTASSIUM SERPL-SCNC: 4.5 MMOL/L (ref 3.5–5.3)
PROT SERPL-MCNC: 7.4 G/DL (ref 6.4–8.4)
RBC # BLD AUTO: 4.59 MILLION/UL (ref 3.88–5.62)
SODIUM SERPL-SCNC: 140 MMOL/L (ref 135–147)
WBC # BLD AUTO: 6.62 THOUSAND/UL (ref 4.31–10.16)

## 2023-01-09 ENCOUNTER — OFFICE VISIT (OUTPATIENT)
Dept: HEMATOLOGY ONCOLOGY | Facility: CLINIC | Age: 43
End: 2023-01-09

## 2023-01-09 VITALS
HEIGHT: 69 IN | WEIGHT: 208 LBS | OXYGEN SATURATION: 98 % | HEART RATE: 84 BPM | TEMPERATURE: 97.4 F | SYSTOLIC BLOOD PRESSURE: 110 MMHG | DIASTOLIC BLOOD PRESSURE: 82 MMHG | BODY MASS INDEX: 30.81 KG/M2

## 2023-01-09 DIAGNOSIS — C84.48 PERIPHERAL T-CELL LYMPHOMA OF LYMPH NODES OF MULTIPLE REGIONS (HCC): Primary | ICD-10-CM

## 2023-01-09 DIAGNOSIS — R74.8 ELEVATED ALKALINE PHOSPHATASE LEVEL: ICD-10-CM

## 2023-01-09 DIAGNOSIS — D32.9 MENINGIOMA (HCC): ICD-10-CM

## 2023-01-09 PROBLEM — T45.1X5A CHEMOTHERAPY INDUCED NEUTROPENIA (HCC): Status: RESOLVED | Noted: 2021-08-06 | Resolved: 2023-01-09

## 2023-01-09 PROBLEM — D70.1 CHEMOTHERAPY INDUCED NEUTROPENIA (HCC): Status: RESOLVED | Noted: 2021-08-06 | Resolved: 2023-01-09

## 2023-01-09 RX ORDER — MELATONIN
5 DAILY
COMMUNITY

## 2023-01-09 NOTE — PROGRESS NOTES
[facility] [place]  [address] [address]  [phone]  [address] [facility] Sandra Rosario [phone]  01/09/23    Discussion: This is a 28-year-old male history of peripheral T-cell lymphoma as outlined  Clinically he is stable  Generally functions without restriction  He has some pain at the medial left thigh  This is momentary, occurring a few times a week  No clear provocative factors although generally does not occur with activity  Etiology unclear  Observation favored  CBC is normal   CMP shows slight persistent elevation of alk phos  CAT scan chest ab pelvis shows no disease progression or recurrence  Previously noted left femoral neck lucency with sclerosis is stable  Brain MRI show Stable right frontal meningioma  I wonder about the connection between left femoral abnormality and slight elevation of alk phos  Of note, this had been noted on baseline PET/CT  This had occurred after some doses of steroids thus opening the possibility that this represents lymphoma related change  Given stability and persistent lack of hypermetabolism, observation is favored  I discussed the above with the patient  The patient  voiced understanding and agreement   ______________________________________________________________________    Chief Complaint   Patient presents with   • Follow-up       HPI:  Oncology History   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 ([facility] [address] )   5/28/2021 Initial Diagnosis    Patient had COVID vaccination in early April, 2nd injection in early May  A week or 2 later he developed substantial bilateral cervical adenopathy  May 28, 2021 CT of the neck showed diffuse bilateral cervical adenopathy measuring up to 2 5 cm on the right  6/17/2021 Biopsy    Right neck lymph node, Biopsy:     - Peripheral T-cell lymphoma, not otherwise specified, see note       7/19/2021 - 11/3/2021 Chemotherapy DOXOrubicin (ADRIAMYCIN), 101 mg, Intravenous, Once, 6 of 6 cycles  Administration: 100 mg (7/19/2021), 101 mg (8/9/2021), 101 mg (8/30/2021), 101 mg (9/20/2021), 101 mg (10/12/2021), 101 mg (11/1/2021)  pegfilgrastim (NEULASTA), 6 mg, Subcutaneous, Once, 1 of 1 cycle  Administration: 6 mg (11/3/2021)  pegfilgrastim (Giorgio Portillo), 6 mg, Subcutaneous, Once, 5 of 5 cycles  Administration: 6 mg (8/9/2021), 6 mg (8/30/2021), 6 mg (9/20/2021), 6 mg (10/12/2021), 6 mg (11/1/2021)  cyclophosphamide (CYTOXAN) IVPB, 1,516 mg, Intravenous, Once, 6 of 6 cycles  Administration: 1,500 mg (7/19/2021), 1,500 mg (8/9/2021), 1,500 mg (8/30/2021), 1,500 mg (9/20/2021), 1,500 mg (10/12/2021), 1,500 mg (11/1/2021)  fosaprepitant (EMEND) IVPB, 150 mg, Intravenous, Once, 6 of 6 cycles  Administration: 150 mg (7/19/2021), 150 mg (8/9/2021), 150 mg (8/30/2021), 150 mg (9/20/2021), 150 mg (10/12/2021), 150 mg (11/1/2021)  brentuximab (ADCETRIS) IVPB, 154 5 mg, Intravenous, Once, 6 of 6 cycles  Administration: 150 mg (8/9/2021), 150 mg (8/30/2021), 150 mg (9/20/2021), 150 mg (10/12/2021), 150 mg (11/1/2021), 150 mg (7/23/2021)     7/23/2021 -  Cancer Staged    July 23, 2021 PET-CT showed marked decrease in the size of cervical and mediastinal lymph nodes compared to prior CT scan of May 28th 2021  Abdominal pelvic lymph nodes measure para aortic nodes up to 1 5 cm  Inguinal nodes up to 2 4 cm and a lucent defect with sclerotic margins in the left femoral neck measuring 1 8 cm  Although these lesions showed SUV of 2 1 max  12/20/2021 Transplant    High-dose chemotherapy, autologous stem cell support  Interval History: Clinically stable  ECOG-  0 - Asymptomatic    Review of Systems   Constitutional: Negative for chills and fever  HENT: Negative for nosebleeds  Eyes: Negative for discharge  Respiratory: Negative for cough and shortness of breath  Cardiovascular: Negative for chest pain     Gastrointestinal: Negative for abdominal pain, constipation and diarrhea  Endocrine: Negative for polydipsia  Genitourinary: Negative for hematuria  Musculoskeletal: Negative for arthralgias  Skin: Negative for color change  Allergic/Immunologic: Negative for immunocompromised state  Neurological: Negative for dizziness and headaches  Hematological: Negative for adenopathy  Psychiatric/Behavioral: Negative for agitation  Past Medical History:   Diagnosis Date   • Interstitial keratitis    • Lymphoma, peripheral T-cell ([facility] [address] )      Patient Active Problem List   Diagnosis   • 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 [facility])   • Rash   • Eosinophilia   • Chemotherapy induced neutropenia (HCC)   • Neoplastic malignant related fatigue   • Anxiety disorder due to medical condition   • Elevated alkaline phosphatase level       Current Outpatient Medications:   •  acyclovir (ZOVIRAX) 800 mg tablet, TAKE 1 TABLET (800 MG TOTAL) BY MOUTH 2 TIMES DAILY  DAYS, Disp: , Rfl:   •  allopurinol (ZYLOPRIM) 300 mg tablet, TAKE 1 TABLET BY MOUTH EVERY DAY (Patient not taking: No sig reported), Disp: 90 tablet, Rfl: 1  •  Multiple Vitamin (Multi-Day Vitamins) TABS, Take 1 tablet by mouth daily, Disp: , Rfl:   •  Omega-3 Fatty Acids (fish oil) 1,000 mg, Take 1,000 mg by mouth daily, Disp: , Rfl:   •  ondansetron (ZOFRAN) 8 mg tablet, Take 1 tablet (8 mg total) by mouth every 8 (eight) hours as needed for nausea or vomiting (Patient not taking: No sig reported), Disp: 30 tablet, Rfl: 3  •  prednisoLONE acetate (PRED FORTE) 1 % ophthalmic suspension, ONE DROP LEFT EYE 4 TIMES A DAY, Disp: , Rfl:   •  predniSONE 50 mg tablet, Take 2 tabs by mouth each day with breakfast starting the day after chemotherapy   (Patient not taking: No sig reported), Disp: 10 tablet, Rfl: 5  No Known Allergies  Past Surgical History:   Procedure Laterality Date   • IR PORT PLACEMENT  7/13/2021   • LYMPH NODE BIOPSY Right 6/17/2021 Procedure: EXCISIONAL BIOPSY OF RIGHT NECK LYMPH NODE WITH LYMPHOMA PROTOCOL;  Surgeon: Amber Fuentes MD;  Location: [facility];  Service: Surgical Oncology   • WISDOM TOOTH EXTRACTION       Social History     Objective:  Vitals:    01/09/23 1337   BP: 110/82   BP Location: Left arm   Patient Position: Sitting   Cuff Size: Large   Pulse: 84   Temp: (!) 97 4 °F (36 3 °C)   TempSrc: Temporal   SpO2: 98%   Weight: 94 3 kg (208 lb)   Height: 5' 9" (1 753 m)     Physical Exam  Constitutional:       Appearance: He is well-developed  HENT:      Head: Normocephalic and atraumatic  Eyes:      Pupils: Pupils are equal, round, and reactive to light  Cardiovascular:      Rate and Rhythm: Normal rate and regular rhythm  Heart sounds: No murmur heard  Pulmonary:      Breath sounds: Normal breath sounds  No wheezing or rales  Abdominal:      Palpations: Abdomen is soft  Tenderness: There is no abdominal tenderness  Musculoskeletal:         General: No tenderness  Normal range of motion  Cervical back: Neck supple  Lymphadenopathy:      Cervical: No cervical adenopathy  Skin:     Findings: No erythema or rash  Neurological:      Mental Status: He is alert and oriented to person, place, and time  Cranial Nerves: No cranial nerve deficit  Deep Tendon Reflexes: Reflexes are normal and symmetric  Psychiatric:         Behavior: Behavior normal            Labs: I personally reviewed the labs and imaging pertinent to this patient care

## 2023-01-25 ENCOUNTER — DOCUMENTATION (OUTPATIENT)
Dept: HEMATOLOGY ONCOLOGY | Facility: CLINIC | Age: 43
End: 2023-01-25

## 2023-01-25 NOTE — PROGRESS NOTES
Pt called in & sds that he has medicaid & he would like to know if the [facility] 12/27/22 can be submitted to ma  Verified on promise that pot is active effective 12/09/22 under recipient id# [id number]  Told pt will email the b/o & have them submit to ma  Pt thanked me for my help    Emailed the b/o to submit the dos 12/27/22 to ma

## 2023-06-28 ENCOUNTER — APPOINTMENT (OUTPATIENT)
Dept: LAB | Facility: CLINIC | Age: 43
End: 2023-06-28
Payer: COMMERCIAL

## 2023-06-28 DIAGNOSIS — C84.48 PERIPHERAL T-CELL LYMPHOMA OF LYMPH NODES OF MULTIPLE REGIONS (HCC): ICD-10-CM

## 2023-06-28 LAB
ALBUMIN SERPL BCP-MCNC: 4.2 G/DL (ref 3.5–5)
ALP SERPL-CCNC: 103 U/L (ref 46–116)
ALT SERPL W P-5'-P-CCNC: 37 U/L (ref 12–78)
ANION GAP SERPL CALCULATED.3IONS-SCNC: 5 MMOL/L
AST SERPL W P-5'-P-CCNC: 25 U/L (ref 5–45)
BASOPHILS # BLD AUTO: 0.06 THOUSANDS/ÂΜL (ref 0–0.1)
BASOPHILS NFR BLD AUTO: 1 % (ref 0–1)
BILIRUB SERPL-MCNC: 0.4 MG/DL (ref 0.2–1)
BUN SERPL-MCNC: 12 MG/DL (ref 5–25)
CALCIUM SERPL-MCNC: 9.5 MG/DL (ref 8.3–10.1)
CHLORIDE SERPL-SCNC: 107 MMOL/L (ref 96–108)
CO2 SERPL-SCNC: 28 MMOL/L (ref 21–32)
CREAT SERPL-MCNC: 0.88 MG/DL (ref 0.6–1.3)
EOSINOPHIL # BLD AUTO: 0.15 THOUSAND/ÂΜL (ref 0–0.61)
EOSINOPHIL NFR BLD AUTO: 2 % (ref 0–6)
ERYTHROCYTE [DISTWIDTH] IN BLOOD BY AUTOMATED COUNT: 12.3 % (ref 11.6–15.1)
GFR SERPL CREATININE-BSD FRML MDRD: 106 ML/MIN/1.73SQ M
GLUCOSE SERPL-MCNC: 108 MG/DL (ref 65–140)
HCT VFR BLD AUTO: 40 % (ref 36.5–49.3)
HGB BLD-MCNC: 13.9 G/DL (ref 12–17)
IMM GRANULOCYTES # BLD AUTO: 0.01 THOUSAND/UL (ref 0–0.2)
IMM GRANULOCYTES NFR BLD AUTO: 0 % (ref 0–2)
LYMPHOCYTES # BLD AUTO: 4.02 THOUSANDS/ÂΜL (ref 0.6–4.47)
LYMPHOCYTES NFR BLD AUTO: 46 % (ref 14–44)
MCH RBC QN AUTO: 33.5 PG (ref 26.8–34.3)
MCHC RBC AUTO-ENTMCNC: 34.8 G/DL (ref 31.4–37.4)
MCV RBC AUTO: 96 FL (ref 82–98)
MONOCYTES # BLD AUTO: 0.69 THOUSAND/ÂΜL (ref 0.17–1.22)
MONOCYTES NFR BLD AUTO: 8 % (ref 4–12)
NEUTROPHILS # BLD AUTO: 3.66 THOUSANDS/ÂΜL (ref 1.85–7.62)
NEUTS SEG NFR BLD AUTO: 43 % (ref 43–75)
NRBC BLD AUTO-RTO: 0 /100 WBCS
PLATELET # BLD AUTO: 227 THOUSANDS/UL (ref 149–390)
PMV BLD AUTO: 9.5 FL (ref 8.9–12.7)
POTASSIUM SERPL-SCNC: 3.9 MMOL/L (ref 3.5–5.3)
PROT SERPL-MCNC: 6.7 G/DL (ref 6.4–8.4)
RBC # BLD AUTO: 4.15 MILLION/UL (ref 3.88–5.62)
SODIUM SERPL-SCNC: 140 MMOL/L (ref 135–147)
WBC # BLD AUTO: 8.59 THOUSAND/UL (ref 4.31–10.16)

## 2023-06-28 PROCEDURE — 36415 COLL VENOUS BLD VENIPUNCTURE: CPT

## 2023-06-28 PROCEDURE — 85025 COMPLETE CBC W/AUTO DIFF WBC: CPT

## 2023-06-28 PROCEDURE — 80053 COMPREHEN METABOLIC PANEL: CPT

## 2023-06-30 ENCOUNTER — HOSPITAL ENCOUNTER (OUTPATIENT)
Dept: RADIOLOGY | Facility: HOSPITAL | Age: 43
Discharge: HOME | End: 2023-06-30
Attending: INTERNAL MEDICINE
Payer: COMMERCIAL

## 2023-06-30 DIAGNOSIS — D32.9 MENINGIOMA (HCC): ICD-10-CM

## 2023-06-30 DIAGNOSIS — C84.48 PERIPHERAL T-CELL LYMPHOMA OF LYMPH NODES OF MULTIPLE REGIONS (HCC): ICD-10-CM

## 2023-06-30 PROCEDURE — G1004 CDSM NDSC: HCPCS

## 2023-06-30 PROCEDURE — 71260 CT THORAX DX C+: CPT

## 2023-06-30 PROCEDURE — 70553 MRI BRAIN STEM W/O & W/DYE: CPT

## 2023-06-30 PROCEDURE — A9585 GADOBUTROL INJECTION: HCPCS | Performed by: RADIOLOGY

## 2023-06-30 PROCEDURE — 74177 CT ABD & PELVIS W/CONTRAST: CPT

## 2023-06-30 RX ADMIN — GADOBUTROL 9 ML: 604.72 INJECTION INTRAVENOUS at 14:49

## 2023-06-30 RX ADMIN — IOHEXOL 100 ML: 350 INJECTION, SOLUTION INTRAVENOUS at 13:49

## 2023-07-10 ENCOUNTER — OFFICE VISIT (OUTPATIENT)
Dept: HEMATOLOGY ONCOLOGY | Facility: CLINIC | Age: 43
End: 2023-07-10

## 2023-07-10 VITALS
TEMPERATURE: 98 F | SYSTOLIC BLOOD PRESSURE: 112 MMHG | HEART RATE: 82 BPM | RESPIRATION RATE: 17 BRPM | WEIGHT: 207.6 LBS | OXYGEN SATURATION: 98 % | DIASTOLIC BLOOD PRESSURE: 78 MMHG | BODY MASS INDEX: 30.75 KG/M2 | HEIGHT: 69 IN

## 2023-07-10 DIAGNOSIS — C84.48 PERIPHERAL T-CELL LYMPHOMA OF LYMPH NODES OF MULTIPLE REGIONS (HCC): Primary | ICD-10-CM

## 2023-07-10 NOTE — H&P (VIEW-ONLY)
[facility] [place]  [address] [place] [address],[address] [place] [address] [phone]    Estrella KO,[birth year], [id number]  07/10/23    Discussion:   In summary, this is a 20-year-old male with a history of peripheral T-cell lymphoma status post Cytoxan/Adriamycin/Adcetris followed by high-dose chemotherapy in December 2021. He had incidental finding of meningioma. Recent CT chest ab pelvis shows no evidence of new or progressive disease. MRI shows stable meningioma, 1.5 cm. Recent CBC shows 46% lymphocytes, otherwise normal.  CMP normal.  [place], he is doing well. No evidence of recurrence at this time. Observation remains appropriate. Follow-up in 6 months with repeat blood work. Decreased libido. T normal.   Port-A-Cath to be removed. I discussed the above with the patient. The patient  voiced understanding and agreement.  ______________________________________________________________________    Chief Complaint   Patient presents with   • Follow-up       HPI:  Oncology History   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 ([address] )   5/28/2021 Initial Diagnosis    Patient had COVID vaccination in early April, 2nd injection in early May. A week or 2 later he developed substantial bilateral cervical adenopathy. May 28, 2021 CT of the neck showed diffuse bilateral cervical adenopathy measuring up to 2.5 cm on the right. 6/17/2021 Biopsy    Right neck lymph node, Biopsy:     - Peripheral T-cell lymphoma, not otherwise specified, see note.      7/19/2021 - 11/3/2021 Chemotherapy    DOXOrubicin (ADRIAMYCIN), 101 mg, Intravenous, Once, 6 of 6 cycles  Administration: 100 mg (7/19/2021), 101 mg (8/9/2021), 101 mg (8/30/2021), 101 mg (9/20/2021), 101 mg (10/12/2021), 101 mg (11/1/2021)  pegfilgrastim (NEULASTA), 6 mg, Subcutaneous, Once, 1 of 1 cycle  Administration: 6 mg (11/3/2021)  pegfilgrastim (NEULASTA ONPRO), 6 mg, Subcutaneous, Once, 5 of 5 cycles  Administration: 6 mg (8/9/2021), 6 mg (8/30/2021), 6 mg (9/20/2021), 6 mg (10/12/2021), 6 mg (11/1/2021)  cyclophosphamide (CYTOXAN) IVPB, 1,516 mg, Intravenous, Once, 6 of 6 cycles  Administration: 1,500 mg (7/19/2021), 1,500 mg (8/9/2021), 1,500 mg (8/30/2021), 1,500 mg (9/20/2021), 1,500 mg (10/12/2021), 1,500 mg (11/1/2021)  fosaprepitant (EMEND) IVPB, 150 mg, Intravenous, Once, 6 of 6 cycles  Administration: 150 mg (7/19/2021), 150 mg (8/9/2021), 150 mg (8/30/2021), 150 mg (9/20/2021), 150 mg (10/12/2021), 150 mg (11/1/2021)  brentuximab (ADCETRIS) IVPB, 154.5 mg, Intravenous, Once, 6 of 6 cycles  Administration: 150 mg (8/9/2021), 150 mg (8/30/2021), 150 mg (9/20/2021), 150 mg (10/12/2021), 150 mg (11/1/2021), 150 mg (7/23/2021)     7/23/2021 -  Cancer Staged    July 23, 2021 PET-CT showed marked decrease in the size of cervical and mediastinal lymph nodes compared to prior CT scan of May 28th 2021. Abdominal pelvic lymph nodes measure para aortic nodes up to 1.5 cm. Inguinal nodes up to 2.4 cm and a lucent defect with sclerotic margins in the left femoral neck measuring 1.8 cm. Although these lesions showed SUV of 2.1 max. 12/20/2021 Transplant    High-dose chemotherapy, autologous stem cell support. Interval History: Clinically stable. ECOG-  0 - Asymptomatic    Review of Systems   Constitutional: Negative for chills and fever. HENT: Negative for nosebleeds. Eyes: Negative for discharge. Respiratory: Negative for cough and shortness of breath. Cardiovascular: Negative for chest pain. Gastrointestinal: Negative for abdominal pain, constipation and diarrhea. Endocrine: Negative for polydipsia. Genitourinary: Negative for hematuria. Musculoskeletal: Negative for arthralgias. Skin: Negative for color change. Allergic/Immunologic: Negative for immunocompromised state. Neurological: Negative for dizziness and headaches. Hematological: Negative for adenopathy. Psychiatric/Behavioral: Negative for agitation. Past Medical History:   Diagnosis Date   • Interstitial keratitis    • Lymphoma, peripheral T-cell ([address])      Patient Active Problem List   Diagnosis   • 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 [facility])   • Rash   • Eosinophilia   • Neoplastic malignant related fatigue   • Anxiety disorder due to medical condition   • Elevated alkaline phosphatase level   • Meningioma (HCC)       Current Outpatient Medications:   •  cholecalciferol (VITAMIN D3) 1,000 units tablet, Take 5 Units by mouth daily, Disp: , Rfl:   •  Multiple Vitamin (Multi-Day Vitamins) TABS, Take 1 tablet by mouth daily, Disp: , Rfl:   •  acyclovir (ZOVIRAX) 800 mg tablet, TAKE 1 TABLET (800 MG TOTAL) BY MOUTH 2 TIMES DAILY  DAYS (Patient not taking: Reported on 1/9/2023), Disp: , Rfl:   •  allopurinol (ZYLOPRIM) 300 mg tablet, TAKE 1 TABLET BY MOUTH EVERY DAY (Patient not taking: No sig reported), Disp: 90 tablet, Rfl: 1  •  Omega-3 Fatty Acids (fish oil) 1,000 mg, Take 1,000 mg by mouth daily (Patient not taking: Reported on 1/9/2023), Disp: , Rfl:   •  ondansetron (ZOFRAN) 8 mg tablet, Take 1 tablet (8 mg total) by mouth every 8 (eight) hours as needed for nausea or vomiting (Patient not taking: Reported on 2/10/2022), Disp: 30 tablet, Rfl: 3  •  prednisoLONE acetate (PRED FORTE) 1 % ophthalmic suspension, ONE DROP LEFT EYE 4 TIMES A DAY (Patient not taking: Reported on 1/9/2023), Disp: , Rfl:   •  predniSONE 50 mg tablet, Take 2 tabs by mouth each day with breakfast starting the day after chemotherapy.  (Patient not taking: Reported on 2/10/2022), Disp: 10 tablet, Rfl: 5  No Known Allergies  Past Surgical History:   Procedure Laterality Date   • IR PORT PLACEMENT  7/13/2021   • LYMPH NODE BIOPSY Right 6/17/2021    Procedure: EXCISIONAL BIOPSY OF RIGHT NECK LYMPH NODE WITH LYMPHOMA PROTOCOL;  Surgeon: Nel Butler MD;  Location: [facility];  Service: Surgical Oncology   • WISDOM TOOTH EXTRACTION       Social History     Objective:  Vitals:    07/10/23 1254   BP: 112/78   BP Location: Left arm   Patient Position: Sitting   Cuff Size: Adult   Pulse: 82   Resp: 17   Temp: 98 °F (36.7 °C)   TempSrc: Temporal   SpO2: 98%   Weight: 94.2 kg (207 lb 9.6 oz)   Height: 5' 9" (1.753 m)     Physical Exam  Constitutional:       Appearance: He is well-developed. HENT:      Head: Normocephalic and atraumatic. Eyes:      Pupils: Pupils are equal, round, and reactive to light. Cardiovascular:      Rate and Rhythm: Normal rate and regular rhythm. Heart sounds: No murmur heard. Pulmonary:      Breath sounds: Normal breath sounds. No wheezing or rales. Abdominal:      Palpations: Abdomen is soft. Tenderness: There is no abdominal tenderness. Musculoskeletal:         General: No tenderness. Normal range of motion. Cervical back: Neck supple. Lymphadenopathy:      Cervical: No cervical adenopathy. Skin:     Findings: No erythema or rash. Neurological:      Mental Status: He is alert and oriented to person, place, and time. Cranial Nerves: No cranial nerve deficit. Deep Tendon Reflexes: Reflexes are normal and symmetric. Psychiatric:         Behavior: Behavior normal.           Labs: I personally reviewed the labs and imaging pertinent to this patient care.

## 2023-07-10 NOTE — PROGRESS NOTES
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 He had incidental finding of meningioma. Recent CT chest ab pelvis shows no evidence of new or progressive disease. MRI shows stable meningioma, 1.5 cm. Recent CBC shows 46% lymphocytes, otherwise normal.  CMP normal.  [place], he is doing well. No evidence of recurrence at this time. Observation remains appropriate. Follow-up in 6 months with repeat blood work. Decreased libido. T normal.   Port-A-Cath to be removed. I discussed the above with the patient. The patient  voiced understanding and agreement.  ______________________________________________________________________    Chief Complaint   Patient presents with   • Follow-up       HPI:  Oncology History   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 ([address])   5/28/2021 Initial Diagnosis    Patient had COVID vaccination in early April, 2nd injection in early May. A week or 2 later he developed substantial bilateral cervical adenopathy. May 28, 2021 CT of the neck showed diffuse bilateral cervical adenopathy measuring up to 2.5 cm on the right. 6/17/2021 Biopsy    Right neck lymph node, Biopsy:     - Peripheral T-cell lymphoma, not otherwise specified, see note.      7/19/2021 - 11/3/2021 Chemotherapy    DOXOrubicin (ADRIAMYCIN), 101 mg, Intravenous, Once, 6 of 6 cycles  Administration: 100 mg (7/19/2021), 101 mg (8/9/2021), 101 mg (8/30/2021), 101 mg (9/20/2021), 101 mg (10/12/2021), 101 mg (11/1/2021)  pegfilgrastim (NEULASTA), 6 mg, Subcutaneous, Once, 1 of 1 cycle  Administration: 6 mg (11/3/2021)  pegfilgrastim (NEULASTA ONPRO), 6 mg, Subcutaneous, Once, 5 of 5 cycles  Administration: 6 mg (8/9/2021), 6 mg (8/30/2021), 6 mg (9/20/2021), 6 mg (10/12/2021), 6 mg (11/1/2021)  cyclophosphamide (CYTOXAN) IVPB, 1,516 mg, Intravenous, Once, 6 of 6 cycles  Administration: 1,500 mg (7/19/2021), 1,500 mg (8/9/2021), 1,500 mg (8/30/2021), 1,500 mg (9/20/2021), 1,500 mg (10/12/2021), 1,500 mg (11/1/2021)  fosaprepitant (EMEND) IVPB, 150 mg, Intravenous, Once, 6 of 6 cycles  Administration: 150 mg (7/19/2021), 150 mg (8/9/2021), 150 mg (8/30/2021), 150 mg (9/20/2021), 150 mg (10/12/2021), 150 mg (11/1/2021)  brentuximab (ADCETRIS) IVPB, 154.5 mg, Intravenous, Once, 6 of 6 cycles  Administration: 150 mg (8/9/2021), 150 mg (8/30/2021), 150 mg (9/20/2021), 150 mg (10/12/2021), 150 mg (11/1/2021), 150 mg (7/23/2021)     7/23/2021 -  Cancer Staged    July 23, 2021 PET-CT showed marked decrease in the size of cervical and mediastinal lymph nodes compared to prior CT scan of May 28th 2021. Abdominal pelvic lymph nodes measure para aortic nodes up to 1.5 cm. Inguinal nodes up to 2.4 cm and a lucent defect with sclerotic margins in the left femoral neck measuring 1.8 cm. Although these lesions showed SUV of 2.1 max. 12/20/2021 Transplant    High-dose chemotherapy, autologous stem cell support. Interval History: Clinically stable. ECOG-  0 - Asymptomatic    Review of Systems   Constitutional: Negative for chills and fever. HENT: Negative for nosebleeds. Eyes: Negative for discharge. Respiratory: Negative for cough and shortness of breath. Cardiovascular: Negative for chest pain. Gastrointestinal: Negative for abdominal pain, constipation and diarrhea. Endocrine: Negative for polydipsia. Genitourinary: Negative for hematuria. Musculoskeletal: Negative for arthralgias. Skin: Negative for color change. Allergic/Immunologic: Negative for immunocompromised state. Neurological: Negative for dizziness and headaches. Hematological: Negative for adenopathy. Psychiatric/Behavioral: Negative for agitation. Past Medical History:   Diagnosis Date   • Interstitial keratitis    • Lymphoma, peripheral T-cell ([address])      Patient Active Problem List   Diagnosis   • 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 [facility])   • Rash   • Eosinophilia   • Neoplastic malignant related fatigue   • Anxiety disorder due to medical condition   • Elevated alkaline phosphatase level   • Meningioma (HCC)       Current Outpatient Medications:   •  cholecalciferol (VITAMIN D3) 1,000 units tablet, Take 5 Units by mouth daily, Disp: , Rfl:   •  Multiple Vitamin (Multi-Day Vitamins) TABS, Take 1 tablet by mouth daily, Disp: , Rfl:   •  acyclovir (ZOVIRAX) 800 mg tablet, TAKE 1 TABLET (800 MG TOTAL) BY MOUTH 2 TIMES DAILY  DAYS (Patient not taking: Reported on 1/9/2023), Disp: , Rfl:   •  allopurinol (ZYLOPRIM) 300 mg tablet, TAKE 1 TABLET BY MOUTH EVERY DAY (Patient not taking: No sig reported), Disp: 90 tablet, Rfl: 1  •  Omega-3 Fatty Acids (fish oil) 1,000 mg, Take 1,000 mg by mouth daily (Patient not taking: Reported on 1/9/2023), Disp: , Rfl:   •  ondansetron (ZOFRAN) 8 mg tablet, Take 1 tablet (8 mg total) by mouth every 8 (eight) hours as needed for nausea or vomiting (Patient not taking: Reported on 2/10/2022), Disp: 30 tablet, Rfl: 3  •  prednisoLONE acetate (PRED FORTE) 1 % ophthalmic suspension, ONE DROP LEFT EYE 4 TIMES A DAY (Patient not taking: Reported on 1/9/2023), Disp: , Rfl:   •  predniSONE 50 mg tablet, Take 2 tabs by mouth each day with breakfast starting the day after chemotherapy.  (Patient not taking: Reported on 2/10/2022), Disp: 10 tablet, Rfl: 5  No Known Allergies  Past Surgical History:   Procedure Laterality Date   • IR PORT PLACEMENT  7/13/2021   • LYMPH NODE BIOPSY Right 6/17/2021    Procedure: EXCISIONAL BIOPSY OF RIGHT NECK LYMPH NODE WITH LYMPHOMA PROTOCOL;  Surgeon: Dennie Crease, MD;  Location: [facility];  Service: Surgical Oncology   • WISDOM TOOTH EXTRACTION       Social History     Objective:  Vitals:    07/10/23 1254   BP: 112/78   BP Location: Left arm   Patient Position: Sitting   Cuff Size: Adult   Pulse: 82   Resp: 17   Temp: 98 °F (36.7 °C)   TempSrc: Temporal   SpO2: 98%   Weight: 94.2 kg (207 lb 9.6 oz)   Height: 5' 9" (1.753 m)     Physical Exam  Constitutional:       Appearance: He is well-developed. HENT:      Head: Normocephalic and atraumatic. Eyes:      Pupils: Pupils are equal, round, and reactive to light. Cardiovascular:      Rate and Rhythm: Normal rate and regular rhythm. Heart sounds: No murmur heard. Pulmonary:      Breath sounds: Normal breath sounds. No wheezing or rales. Abdominal:      Palpations: Abdomen is soft. Tenderness: There is no abdominal tenderness. Musculoskeletal:         General: No tenderness. Normal range of motion. Cervical back: Neck supple. Lymphadenopathy:      Cervical: No cervical adenopathy. Skin:     Findings: No erythema or rash. Neurological:      Mental Status: He is alert and oriented to person, place, and time. Cranial Nerves: No cranial nerve deficit. Deep Tendon Reflexes: Reflexes are normal and symmetric. Psychiatric:         Behavior: Behavior normal.           Labs: I personally reviewed the labs and imaging pertinent to this patient care.

## 2023-07-13 ENCOUNTER — ANESTHESIA EVENT (OUTPATIENT)
Dept: PERIOP | Facility: AMBULARY SURGERY CENTER | Age: 43
End: 2023-07-13
Payer: COMMERCIAL

## 2023-07-21 NOTE — PRE-PROCEDURE INSTRUCTIONS
Pre-Surgery Instructions:   Medication Instructions   • cholecalciferol (VITAMIN D3) 1,000 units tablet Hold starting today 7/21   • Multiple Vitamin (Multi-Day Vitamins) TABS Hold starting today 7/21      Medication instructions for day surgery reviewed. Please use only a sip of water to take your instructed medications. Avoid all over the counter vitamins, supplements and NSAIDS starting today. Tylenol is ok to take as needed. You will receive a call one business day prior to surgery with an arrival time and hospital directions. If your surgery is scheduled on a Monday, the hospital will be calling you on the Friday prior to your surgery. If you have not heard from anyone by 8pm, please call the hospital supervisor through the hospital  at [phone]. Jose Miguel Cecelia [phone]). Do not eat or drink anything after midnight the night before your surgery, including candy, mints, lifesavers, or chewing gum. Do not drink alcohol 24hrs before your surgery. Try not to smoke at least 24hrs before your surgery. Follow the pre surgery showering instructions as listed in the [facility] Surgical Experience Booklet” or otherwise provided by your surgeon's office. Do not shave the surgical area 24 hours before surgery. Do not apply any lotions, creams, including makeup, cologne, deodorant, or perfumes after showering on the day of your surgery. No contact lenses, eye make-up, or artificial eyelashes. Remove nail polish, including gel polish, and any artificial, gel, or acrylic nails if possible. Remove all jewelry including rings and body piercing jewelry. Wear causal clothing that is easy to take on and off. Consider your type of surgery. Keep any valuables, jewelry, piercings at home. Please bring any specially ordered equipment (sling, braces) if indicated. Arrange for a responsible person to drive you to and from the hospital on the day of your surgery. Visitor Guidelines discussed.      Call the surgeon's office with any new illnesses, exposures, or additional questions prior to surgery. Please reference your [facility] Surgical Experience Booklet” for additional information to prepare for your upcoming surgery.

## 2023-07-24 NOTE — ANESTHESIA PREPROCEDURE EVALUATION
Procedure:  REMOVAL VENOUS PORT (PORT-A-CATH)IR (Chest)    Relevant Problems   ANESTHESIA (within normal limits)      CARDIO (within normal limits)      ENDO (within normal limits)      GI/HEPATIC (within normal limits)      /RENAL (within normal limits)      HEMATOLOGY   (+) 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 (HCC)      NEURO/PSYCH   (+) Anxiety disorder due to medical condition      PULMONARY (within normal limits)      Nervous and Auditory   (+) Meningioma (HCC)      Other   (+) Eosinophilia   (+) 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 ([address])      TTE 7/2021:  LEFT VENTRICLE:  Systolic function was normal. Ejection fraction was estimated to be 60 %. There were no regional wall motion abnormalities.     AORTIC VALVE:  There was trace regurgitation.     TRICUSPID VALVE:  There was trace regurgitation. Lab Results   Component Value Date    WBC 8.59 06/28/2023    HGB 13.9 06/28/2023    HCT 40.0 06/28/2023    MCV 96 06/28/2023     06/28/2023     Lab Results   Component Value Date    SODIUM 140 06/28/2023    K 3.9 06/28/2023     06/28/2023    CO2 28 06/28/2023    BUN 12 06/28/2023    CREATININE 0.88 06/28/2023    GLUC 108 06/28/2023    CALCIUM 9.5 06/28/2023     No results found for: "INR", "PROTIME"  No results found for: "HGBA1C"       Physical Exam    Airway    Mallampati score: II  TM Distance: >3 FB  Neck ROM: full     Dental   No notable dental hx     Cardiovascular  Cardiovascular exam normal    Pulmonary  Pulmonary exam normal     Other Findings        Anesthesia Plan  ASA Score- 2     Anesthesia Type- IV sedation with anesthesia with ASA Monitors. Additional Monitors:   Airway Plan:           Plan Factors-Exercise tolerance (METS): >4 METS. Chart reviewed. Existing labs reviewed. Patient summary reviewed. Induction- intravenous.     Postoperative Plan-     Informed Consent- Anesthetic plan and risks discussed with patient. I personally reviewed this patient with the CRNA. Discussed and agreed on the Anesthesia Plan with the CRNA. Herb Rider

## 2023-07-25 ENCOUNTER — HOSPITAL ENCOUNTER (OUTPATIENT)
Facility: AMBULARY SURGERY CENTER | Age: 43
Setting detail: OUTPATIENT SURGERY
Discharge: HOME | End: 2023-07-25
Attending: RADIOLOGY | Admitting: RADIOLOGY
Payer: COMMERCIAL

## 2023-07-25 ENCOUNTER — ANESTHESIA (OUTPATIENT)
Dept: PERIOP | Facility: AMBULARY SURGERY CENTER | Age: 43
End: 2023-07-25
Payer: COMMERCIAL

## 2023-07-25 VITALS
OXYGEN SATURATION: 98 % | RESPIRATION RATE: 20 BRPM | HEIGHT: 69 IN | SYSTOLIC BLOOD PRESSURE: 108 MMHG | HEART RATE: 70 BPM | WEIGHT: 206 LBS | DIASTOLIC BLOOD PRESSURE: 72 MMHG | TEMPERATURE: 97.4 F | BODY MASS INDEX: 30.51 KG/M2

## 2023-07-25 PROCEDURE — 36589 REMOVAL TUNNELED CV CATH: CPT | Performed by: RADIOLOGY

## 2023-07-25 RX ORDER — SODIUM CHLORIDE, SODIUM LACTATE, POTASSIUM CHLORIDE, CALCIUM CHLORIDE 600; 310; 30; 20 MG/100ML; MG/100ML; MG/100ML; MG/100ML
INJECTION, SOLUTION INTRAVENOUS CONTINUOUS PRN
Status: DISCONTINUED | OUTPATIENT
Start: 2023-07-25 | End: 2023-07-25

## 2023-07-25 RX ORDER — MIDAZOLAM HYDROCHLORIDE 2 MG/2ML
INJECTION, SOLUTION INTRAMUSCULAR; INTRAVENOUS AS NEEDED
Status: DISCONTINUED | OUTPATIENT
Start: 2023-07-25 | End: 2023-07-25

## 2023-07-25 RX ORDER — LIDOCAINE HYDROCHLORIDE AND EPINEPHRINE 10; 10 MG/ML; UG/ML
INJECTION, SOLUTION INFILTRATION; PERINEURAL AS NEEDED
Status: DISCONTINUED | OUTPATIENT
Start: 2023-07-25 | End: 2023-07-25 | Stop reason: HOSPADM

## 2023-07-25 RX ORDER — SODIUM CHLORIDE 9 MG/ML
INJECTION, SOLUTION INTRAVENOUS AS NEEDED
Status: DISCONTINUED | OUTPATIENT
Start: 2023-07-25 | End: 2023-07-25 | Stop reason: HOSPADM

## 2023-07-25 RX ORDER — FENTANYL CITRATE 50 UG/ML
INJECTION, SOLUTION INTRAMUSCULAR; INTRAVENOUS AS NEEDED
Status: DISCONTINUED | OUTPATIENT
Start: 2023-07-25 | End: 2023-07-25

## 2023-07-25 RX ADMIN — FENTANYL CITRATE 50 MCG: 50 INJECTION INTRAMUSCULAR; INTRAVENOUS at 10:08

## 2023-07-25 RX ADMIN — FENTANYL CITRATE 50 MCG: 50 INJECTION INTRAMUSCULAR; INTRAVENOUS at 10:00

## 2023-07-25 RX ADMIN — SODIUM CHLORIDE, SODIUM LACTATE, POTASSIUM CHLORIDE, AND CALCIUM CHLORIDE: .6; .31; .03; .02 INJECTION, SOLUTION INTRAVENOUS at 09:54

## 2023-07-25 RX ADMIN — MIDAZOLAM 2 MG: 1 INJECTION INTRAMUSCULAR; INTRAVENOUS at 09:51

## 2023-07-25 NOTE — DISCHARGE INSTRUCTIONS
Implanted Venous Access Port Removal    WHAT YOU NEED TO KNOW:   An implanted venous access port is a device used to give treatments and take blood. It may also be called a central venous access device (CVAD). The port is a small container that is placed under your skin, usually in your upper chest. A port can also be placed in your arm or abdomen. The port is attached to a catheter that enters a large vein. DISCHARGE INSTRUCTIONS:   Resume your normal diet. Small sips of flat soda will help with mild nausea. Prevent an infection:     Wash your hands often. Use soap and water. Clean your hands before and  after you care for your incision. Check your skin for infection every day. Look for redness, swelling, or fluid oozing from the incision site. Dressing may come off in 24 hours. Medical glue will peel off on its own in 5 to 10 days. You may shower 24 hours after procedure. Follow up with your healthcare provider as directed  Write down your questions so you remember to ask them during your visits. Activity:  You may return to your daily activities when the area heals. Contact Interventional Radiology at [phone] [place] PATIENTS: Contact Interventional Radiology at [phone]) Mel Flores PATIENTS: Contact Interventional Radiology at [phone]) if:     You have a fever. You have persistent nausea. Your inciscion site is red, swollen, or draining pus. You have questions or concerns about your condition or care. Seek care immediately or call 911 if:  Blood soaks through your bandage. The skin over or around your incision breaks open. Your heart is jumping or fluttering. You have a headache, blurred vision, and feel confused. You have pain in your arm, neck, shoulder, or chest.    You have trouble breathing that is getting worse over time.

## 2023-07-25 NOTE — OP NOTE
Right chest port removal 7/25/23     History-   Lymphoma       Contrast- None     Fluoroscopy time: None         Procedure- The patient was identified verbally and by wristband. Timeout was performed. Informed consent was obtained. Following obtaining informed consent, the patient was prepped and   draped in the usual sterile fashion. The region of the right anterior   chest port was anesthetized using 1% lidocaine. A incision was made   over the previously placed chest port with a 15 blade scalpel. Using   blunt and sharp dissection the port was removed in its entirety. The   port pocket was flushed with copious saline. The incision was   approximated using 3-0 Vicryl and tissue adhesive. The patient tolerated   the procedure well without apparent immediate complication. The patient   left the IR department in unchanged condition. Dr. Noni Merritt performed and   directly supervised the entire procedure. Findings-      The skin overlying the chest wall is nonerythematous. There is no   evidence of purulent exudate. The port was removed in its entirety   without complication. Impression-       Successful removal of right chest port.

## 2023-07-25 NOTE — ANESTHESIA POSTPROCEDURE EVALUATION
Post-Op Assessment Note    CV Status:  Stable  Pain Score: 0    Pain management: adequate     Mental Status:  Awake and alert   Hydration Status:  Stable   PONV Controlled:  None   Airway Patency:  Patent and adequate      Post Op Vitals Reviewed: Yes      Staff: CRNA, Anesthesiologist         No notable events documented.     BP   105/65   Temp  97.4   Pulse  80   Resp   16   SpO2   95%

## 2023-07-25 NOTE — INTERVAL H&P NOTE
Patient arrived to [facility] for port removal    The procedure and risks were discussed with the patient. All questions were answered. Informed consent was obtained. H & P reviewed after examining the patient and I find no changes in the patient condition since the H & P has been written. /68   Pulse 80   Temp (!) 97 °F (36.1 °C) (Temporal)   Resp 16   Ht 5' 9" (1.753 m)   Wt 93.4 kg (206 lb)   SpO2 97%   BMI 30.42 kg/m²     Patient re-evaluated.  Accept as history and physical.    [facility], DO/July 25, 2023/9:15 AM

## 2023-12-07 ENCOUNTER — TELEPHONE (OUTPATIENT)
Dept: HEMATOLOGY ONCOLOGY | Facility: CLINIC | Age: 43
End: 2023-12-07

## 2023-12-07 NOTE — TELEPHONE ENCOUNTER
Patient Call    Who are you speaking with? Patient    If it is not the patient, are they listed on an active communication consent form? N/A   What is the reason for this call? He is getting new insurance for next year and asks if he will be scheduled for test next year and and estimate of their cost so he can pick the best plan   Does this require a call back? Yes   If a call back is required, please list best call back number [phone]    If a call back is required, advise that a message will be forwarded to their care team and someone will return their call as soon as possible. Did you relay this information to the patient?  Yes

## 2024-01-10 ENCOUNTER — TELEPHONE (OUTPATIENT)
Dept: HEMATOLOGY ONCOLOGY | Facility: CLINIC | Age: 44
End: 2024-01-10

## 2024-01-10 NOTE — TELEPHONE ENCOUNTER
Returned call to pt. Pt tested positive yesterday. 1/15 will be day 6. Advised pt as long as he is asymptomatic and able to wear a mask, appt can stay as scheduled. Pt will call us Monday am to let us know if virtual appt is needed instead.

## 2024-01-10 NOTE — TELEPHONE ENCOUNTER
Patient Call    Who are you speaking with? Patient    If it is not the patient, are they listed on an active communication consent form? Yes   What is the reason for this call? Tested positive for COVID, appmt 15th, please advise   Does this require a call back? Yes   If a call back is required, please list [facility] call back number [phone]    If a call back is required, advise that a message will be forwarded to their care team and someone will return their call as soon as possible.   Did you relay this information to the patient? Yes

## 2024-01-12 ENCOUNTER — TELEPHONE (OUTPATIENT)
Dept: HEMATOLOGY ONCOLOGY | Facility: CLINIC | Age: 44
End: 2024-01-12

## 2024-01-12 ENCOUNTER — APPOINTMENT (OUTPATIENT)
Dept: LAB | Facility: CLINIC | Age: 44
End: 2024-01-12
Payer: COMMERCIAL

## 2024-01-12 DIAGNOSIS — C84.48 PERIPHERAL T-CELL LYMPHOMA OF LYMPH NODES OF MULTIPLE REGIONS (HCC): ICD-10-CM

## 2024-01-12 LAB
ALBUMIN SERPL BCP-MCNC: 4.5 G/DL (ref 3.5–5)
ALP SERPL-CCNC: 78 U/L (ref 34–104)
ALT SERPL W P-5'-P-CCNC: 22 U/L (ref 7–52)
ANION GAP SERPL CALCULATED.3IONS-SCNC: 10 MMOL/L
AST SERPL W P-5'-P-CCNC: 23 U/L (ref 5–45)
BASOPHILS # BLD AUTO: 0.04 THOUSANDS/ÂΜL (ref 0–0.1)
BASOPHILS NFR BLD AUTO: 1 % (ref 0–1)
BILIRUB SERPL-MCNC: 0.39 MG/DL (ref 0.2–1)
BUN SERPL-MCNC: 10 MG/DL (ref 5–25)
CALCIUM SERPL-MCNC: 9.2 MG/DL (ref 8.4–10.2)
CHLORIDE SERPL-SCNC: 101 MMOL/L (ref 96–108)
CO2 SERPL-SCNC: 31 MMOL/L (ref 21–32)
CREAT SERPL-MCNC: 0.83 MG/DL (ref 0.6–1.3)
EOSINOPHIL # BLD AUTO: 0.13 THOUSAND/ÂΜL (ref 0–0.61)
EOSINOPHIL NFR BLD AUTO: 2 % (ref 0–6)
ERYTHROCYTE [DISTWIDTH] IN BLOOD BY AUTOMATED COUNT: 12.1 % (ref 11.6–15.1)
GLUCOSE SERPL-MCNC: 103 MG/DL (ref 65–140)
HCT VFR BLD AUTO: 45.1 % (ref 36.5–46.1)
HGB BLD-MCNC: 14.9 G/DL (ref 12–15.4)
IMM GRANULOCYTES # BLD AUTO: 0.01 THOUSAND/UL (ref 0–0.2)
IMM GRANULOCYTES NFR BLD AUTO: 0 % (ref 0–2)
LYMPHOCYTES # BLD AUTO: 3.26 THOUSANDS/ÂΜL (ref 0.6–4.47)
LYMPHOCYTES NFR BLD AUTO: 54 % (ref 14–44)
MCH RBC QN AUTO: 32.9 PG (ref 26.8–34.3)
MCHC RBC AUTO-ENTMCNC: 33 G/DL (ref 31.4–37.4)
MCV RBC AUTO: 100 FL (ref 82–98)
MONOCYTES # BLD AUTO: 0.55 THOUSAND/ÂΜL (ref 0.17–1.22)
MONOCYTES NFR BLD AUTO: 9 % (ref 4–12)
NEUTROPHILS # BLD AUTO: 2.05 THOUSANDS/ÂΜL (ref 1.85–7.62)
NEUTS SEG NFR BLD AUTO: 34 % (ref 43–75)
NRBC BLD AUTO-RTO: 0 /100 WBCS
PLATELET # BLD AUTO: 215 THOUSANDS/UL (ref 149–390)
PMV BLD AUTO: 9.8 FL (ref 8.9–12.7)
POTASSIUM SERPL-SCNC: 4.5 MMOL/L (ref 3.5–5.3)
PROT SERPL-MCNC: 6.9 G/DL (ref 6.4–8.4)
RBC # BLD AUTO: 4.53 MILLION/UL (ref 3.88–5.12)
SODIUM SERPL-SCNC: 142 MMOL/L (ref 135–147)
WBC # BLD AUTO: 6.04 THOUSAND/UL (ref 4.31–10.16)

## 2024-01-12 PROCEDURE — 80053 COMPREHEN METABOLIC PANEL: CPT

## 2024-01-12 PROCEDURE — 36415 COLL VENOUS BLD VENIPUNCTURE: CPT

## 2024-01-12 PROCEDURE — 85025 COMPLETE CBC W/AUTO DIFF WBC: CPT

## 2024-01-15 ENCOUNTER — OFFICE VISIT (OUTPATIENT)
Dept: HEMATOLOGY ONCOLOGY | Facility: CLINIC | Age: 44
End: 2024-01-15
Payer: COMMERCIAL

## 2024-01-15 VITALS
HEART RATE: 95 BPM | RESPIRATION RATE: 17 BRPM | WEIGHT: 207.6 LBS | BODY MASS INDEX: 30.75 KG/M2 | OXYGEN SATURATION: 95 % | DIASTOLIC BLOOD PRESSURE: 86 MMHG | SYSTOLIC BLOOD PRESSURE: 124 MMHG | TEMPERATURE: 97.3 F | HEIGHT: 69 IN

## 2024-01-15 DIAGNOSIS — C84.48 PERIPHERAL T-CELL LYMPHOMA OF LYMPH NODES OF MULTIPLE REGIONS (HCC): Primary | ICD-10-CM

## 2024-01-15 PROBLEM — D72.10 EOSINOPHILIA: Status: RESOLVED | Noted: 2021-07-28 | Resolved: 2024-01-15

## 2024-01-15 PROBLEM — R53.0 NEOPLASTIC MALIGNANT RELATED FATIGUE: Status: RESOLVED | Noted: 2021-10-06 | Resolved: 2024-01-15

## 2024-01-15 PROCEDURE — 99213 OFFICE O/P EST LOW 20 MIN: CPT | Performed by: INTERNAL MEDICINE

## 2024-01-15 NOTE — PROGRESS NOTES
[facility] [place]  [address] [address]  [phone]    Dion Delgado,1980, [id number]  01/15/24    Discussion:   In summary, this is a 43-year-old male with a history of peripheral T-cell lymphoma status post Cytoxan/Adriamycin/Adcetris followed by high-dose chemotherapy in December 2021.  Incidental meningioma.  Clinically he is doing well.  About a week ago he had some upper respiratory symptoms.  COVID-positive.  Symptoms have largely resolved.  Otherwise he is able to function without restriction.  Recent CBC and CMP are essentially normal.  No palpable lymphadenopathy or organomegaly.  Most recent imaging was in June 2023, 2 years after treatment completion.  Physical exam and laboratory monitoring at this time.  Follow-up in 6 months with repeat blood work.    I discussed the above with the patient.  The patient  voiced understanding and agreement.  ______________________________________________________________________    Chief Complaint   Patient presents with    Follow-up       HPI:  Oncology History   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 (HCC)   5/28/2021 Initial Diagnosis    Patient had COVID vaccination in early April, 2nd injection in early May.  A week or 2 later he developed substantial bilateral cervical adenopathy.  May 28, 2021 CT of the neck showed diffuse bilateral cervical adenopathy measuring up to 2.5 cm on the right.     6/17/2021 Biopsy    Right neck lymph node, Biopsy:     - Peripheral T-cell lymphoma, not otherwise specified, see note.     7/19/2021 - 11/3/2021 Chemotherapy    DOXOrubicin (ADRIAMYCIN), 101 mg, Intravenous, Once, 6 of 6 cycles  Administration: 100 mg (7/19/2021), 101 mg (8/9/2021), 101 mg (8/30/2021), 101 mg (9/20/2021), 101 mg (10/12/2021), 101 mg (11/1/2021)  pegfilgrastim (NEULASTA), 6 mg, Subcutaneous, Once, 1 of 1 cycle  Administration: 6 mg  (11/3/2021)  pegfilgrastim (NEULASTA ONPRO), 6 mg, Subcutaneous, Once, 5 of 5 cycles  Administration: 6 mg (8/9/2021), 6 mg (8/30/2021), 6 mg (9/20/2021), 6 mg (10/12/2021), 6 mg (11/1/2021)  cyclophosphamide (CYTOXAN) IVPB, 1,516 mg, Intravenous, Once, 6 of 6 cycles  Administration: 1,500 mg (7/19/2021), 1,500 mg (8/9/2021), 1,500 mg (8/30/2021), 1,500 mg (9/20/2021), 1,500 mg (10/12/2021), 1,500 mg (11/1/2021)  fosaprepitant (EMEND) IVPB, 150 mg, Intravenous, Once, 6 of 6 cycles  Administration: 150 mg (7/19/2021), 150 mg (8/9/2021), 150 mg (8/30/2021), 150 mg (9/20/2021), 150 mg (10/12/2021), 150 mg (11/1/2021)  brentuximab (ADCETRIS) IVPB, 154.5 mg, Intravenous, Once, 6 of 6 cycles  Administration: 150 mg (8/9/2021), 150 mg (8/30/2021), 150 mg (9/20/2021), 150 mg (10/12/2021), 150 mg (11/1/2021), 150 mg (7/23/2021)     7/23/2021 -  Cancer Staged    July 23, 2021 PET-CT showed marked decrease in the size of cervical and mediastinal lymph nodes compared to prior CT scan of May 28th 2021. Abdominal pelvic lymph nodes measure para aortic nodes up to 1.5 cm.  Inguinal nodes up to 2.4 cm and a lucent defect with sclerotic margins in the left femoral neck measuring 1.8 cm.  Although these lesions showed SUV of 2.1 max.       12/20/2021 Transplant    High-dose chemotherapy, autologous stem cell support.          Interval History: Clinically stable.  ECOG-  0 - Asymptomatic    Review of Systems   Constitutional:  Negative for appetite change, diaphoresis, fatigue and fever.   HENT:  Negative for sinus pain.    Eyes:  Negative for discharge.   Respiratory:  Negative for cough and shortness of breath.    Cardiovascular:  Negative for chest pain.   Gastrointestinal:  Negative for abdominal pain, constipation and diarrhea.   Endocrine: Negative for cold intolerance.   Genitourinary:  Negative for difficulty urinating and hematuria.   Musculoskeletal:  Negative for joint swelling.   Skin:  Negative for rash.    Allergic/Immunologic: Negative for environmental allergies.   Neurological:  Negative for dizziness and headaches.   Hematological:  Negative for adenopathy.   Psychiatric/Behavioral:  Negative for agitation.        Past Medical History:   Diagnosis Date    Interstitial keratitis     Lymphoma, peripheral T-cell (HCC)      Patient Active Problem List   Diagnosis    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 (HCC)    Rash    Eosinophilia    Neoplastic malignant related fatigue    Anxiety disorder due to medical condition    Elevated alkaline phosphatase level    Meningioma (HCC)       Current Outpatient Medications:     cholecalciferol (VITAMIN D3) 1,000 units tablet, Take 5 Units by mouth daily, Disp: , Rfl:     Multiple Vitamin (Multi-Day Vitamins) TABS, Take 1 tablet by mouth daily, Disp: , Rfl:     Omega-3 Fatty Acids (fish oil) 1,000 mg, Take 1,000 mg by mouth daily, Disp: , Rfl:     sertraline (Zoloft) 50 mg tablet, Take 50 mg by mouth daily, Disp: , Rfl:     prednisoLONE acetate (PRED FORTE) 1 % ophthalmic suspension, ONE DROP LEFT EYE 4 TIMES A DAY (Patient not taking: Reported on 1/15/2024), Disp: , Rfl:   No Known Allergies  Past Surgical History:   Procedure Laterality Date    IR PORT PLACEMENT  07/13/2021    LIMBAL STEM CELL TRANSPLANT N/A 12/2021    [place]    LYMPH NODE BIOPSY Right 06/17/2021    Procedure: EXCISIONAL BIOPSY OF RIGHT NECK LYMPH NODE WITH LYMPHOMA PROTOCOL;  Surgeon: Alla Ramirez MD;  [facility]: [facility];  Service: Surgical Oncology    [place] RMVL JOSE CTR VAD W/SUBQ PORT/ CTR/PRPH INSJ N/A 7/25/2023    Procedure: REMOVAL VENOUS PORT (PORT-A-CATH)IR;  Surgeon: Hernesto Carmen DO;  [facility]: [facility];  Service: Interventional Radiology    WISDOM TOOTH EXTRACTION       Social History     Objective:  Vitals:    01/15/24 1307   BP: 124/86   BP Location: Left arm   Patient Position: Sitting   Cuff Size: Adult   Pulse: 95   Resp: 17   Temp: (!) 97.3 °F (36.3  "°C)   SpO2: 95%   Weight: 94.2 kg (207 lb 9.6 oz)   Height: 5' 9\" (1.753 m)     Physical Exam  Constitutional:       Appearance: Dion is well-developed.   HENT:      Head: Normocephalic and atraumatic.      Mouth/Throat:      Mouth: Mucous membranes are moist.   Eyes:      Pupils: Pupils are equal, round, and reactive to light.   Cardiovascular:      Rate and Rhythm: Normal rate and regular rhythm.      Heart sounds: No murmur heard.  Pulmonary:      Breath sounds: Normal breath sounds. No wheezing or rales.   Abdominal:      Palpations: Abdomen is soft.      Tenderness: There is no abdominal tenderness.   Musculoskeletal:         General: No tenderness. Normal range of motion.      Cervical back: Neck supple.   Lymphadenopathy:      Cervical: No cervical adenopathy.   Skin:     Findings: No erythema or rash.   Neurological:      Mental Status: Dion is alert and oriented to person, place, and time.      Cranial Nerves: No cranial nerve deficit.      Deep Tendon Reflexes: Reflexes are normal and symmetric.   Psychiatric:         Behavior: Behavior normal.           Labs:  I personally reviewed the labs and imaging pertinent to this patient care.  "

## 2024-07-31 ENCOUNTER — TELEPHONE (OUTPATIENT)
Dept: HEMATOLOGY ONCOLOGY | Facility: CLINIC | Age: 44
End: 2024-07-31

## 2024-07-31 NOTE — TELEPHONE ENCOUNTER
Left voicemail reminder for patient to obtain labs prior to appointment on Monday with Dr. Nieves.  Provided [employer] number for questions or concerns.

## 2024-08-03 ENCOUNTER — APPOINTMENT (OUTPATIENT)
Dept: LAB | Facility: CLINIC | Age: 44
End: 2024-08-03
Payer: COMMERCIAL

## 2024-08-03 DIAGNOSIS — C84.48 PERIPHERAL T-CELL LYMPHOMA OF LYMPH NODES OF MULTIPLE REGIONS (HCC): ICD-10-CM

## 2024-08-03 LAB
ALBUMIN SERPL BCG-MCNC: 4.1 G/DL (ref 3.5–5)
ALP SERPL-CCNC: 77 U/L (ref 34–104)
ALT SERPL W P-5'-P-CCNC: 19 U/L (ref 7–52)
ANION GAP SERPL CALCULATED.3IONS-SCNC: 8 MMOL/L (ref 4–13)
AST SERPL W P-5'-P-CCNC: 19 U/L (ref 5–45)
BASOPHILS # BLD AUTO: 0.08 THOUSANDS/ÂΜL (ref 0–0.1)
BASOPHILS NFR BLD AUTO: 1 % (ref 0–1)
BILIRUB SERPL-MCNC: 0.58 MG/DL (ref 0.2–1)
BUN SERPL-MCNC: 11 MG/DL (ref 5–25)
CALCIUM SERPL-MCNC: 9.4 MG/DL (ref 8.4–10.2)
CHLORIDE SERPL-SCNC: 106 MMOL/L (ref 96–108)
CO2 SERPL-SCNC: 27 MMOL/L (ref 21–32)
CREAT SERPL-MCNC: 0.83 MG/DL (ref 0.6–1.3)
EOSINOPHIL # BLD AUTO: 0.23 THOUSAND/ÂΜL (ref 0–0.61)
EOSINOPHIL NFR BLD AUTO: 3 % (ref 0–6)
ERYTHROCYTE [DISTWIDTH] IN BLOOD BY AUTOMATED COUNT: 12.3 % (ref 11.6–15.1)
GLUCOSE P FAST SERPL-MCNC: 91 MG/DL (ref 65–99)
HCT VFR BLD AUTO: 42.5 % (ref 36.5–46.1)
HGB BLD-MCNC: 14.6 G/DL (ref 12–15.4)
IMM GRANULOCYTES # BLD AUTO: 0.03 THOUSAND/UL (ref 0–0.2)
IMM GRANULOCYTES NFR BLD AUTO: 0 % (ref 0–2)
LYMPHOCYTES # BLD AUTO: 2.79 THOUSANDS/ÂΜL (ref 0.6–4.47)
LYMPHOCYTES NFR BLD AUTO: 36 % (ref 14–44)
MCH RBC QN AUTO: 32.7 PG (ref 26.8–34.3)
MCHC RBC AUTO-ENTMCNC: 34.4 G/DL (ref 31.4–37.4)
MCV RBC AUTO: 95 FL (ref 82–98)
MONOCYTES # BLD AUTO: 0.62 THOUSAND/ÂΜL (ref 0.17–1.22)
MONOCYTES NFR BLD AUTO: 8 % (ref 4–12)
NEUTROPHILS # BLD AUTO: 3.98 THOUSANDS/ÂΜL (ref 1.85–7.62)
NEUTS SEG NFR BLD AUTO: 52 % (ref 43–75)
NRBC BLD AUTO-RTO: 0 /100 WBCS
PLATELET # BLD AUTO: 219 THOUSANDS/UL (ref 149–390)
PMV BLD AUTO: 9.7 FL (ref 8.9–12.7)
POTASSIUM SERPL-SCNC: 4 MMOL/L (ref 3.5–5.3)
PROT SERPL-MCNC: 6.4 G/DL (ref 6.4–8.4)
RBC # BLD AUTO: 4.46 MILLION/UL (ref 3.88–5.12)
SODIUM SERPL-SCNC: 141 MMOL/L (ref 135–147)
WBC # BLD AUTO: 7.73 THOUSAND/UL (ref 4.31–10.16)

## 2024-08-03 PROCEDURE — 85025 COMPLETE CBC W/AUTO DIFF WBC: CPT

## 2024-08-03 PROCEDURE — 36415 COLL VENOUS BLD VENIPUNCTURE: CPT

## 2024-08-03 PROCEDURE — 80053 COMPREHEN METABOLIC PANEL: CPT

## 2024-08-05 ENCOUNTER — OFFICE VISIT (OUTPATIENT)
Dept: HEMATOLOGY ONCOLOGY | Facility: CLINIC | Age: 44
End: 2024-08-05
Payer: COMMERCIAL

## 2024-08-05 VITALS
HEIGHT: 69 IN | BODY MASS INDEX: 31.25 KG/M2 | WEIGHT: 211 LBS | HEART RATE: 89 BPM | RESPIRATION RATE: 17 BRPM | SYSTOLIC BLOOD PRESSURE: 124 MMHG | DIASTOLIC BLOOD PRESSURE: 82 MMHG | OXYGEN SATURATION: 97 % | TEMPERATURE: 97.6 F

## 2024-08-05 DIAGNOSIS — C84.48 PERIPHERAL T-CELL LYMPHOMA OF LYMPH NODES OF MULTIPLE REGIONS (HCC): Primary | ICD-10-CM

## 2024-08-05 DIAGNOSIS — D32.9 MENINGIOMA (HCC): ICD-10-CM

## 2024-08-05 PROCEDURE — 99213 OFFICE O/P EST LOW 20 MIN: CPT | Performed by: INTERNAL MEDICINE

## 2024-08-05 NOTE — PROGRESS NOTES
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  Incidental meningioma.  Clinically he is doing well.  He continues to work full-time without restriction.  No new medical issues.  Recent CBC and chemistry are normal.  We reviewed that per NCCN guidelines clinical follow-up is applicable after 2 years post transplant.  Follow-up in 1 year with repeat brain MRI to monitor meningioma.  Asked patient to call sooner if any questions or problems arise in the interim.    I discussed the above with the patient.  The patient  voiced understanding and agreement.  ______________________________________________________________________    Chief Complaint   Patient presents with    Follow-up       HPI:  Oncology History   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 (HCC)   5/28/2021 Initial Diagnosis    Patient had COVID vaccination in early April, 2nd injection in early May.  A week or 2 later he developed substantial bilateral cervical adenopathy.  May 28, 2021 CT of the neck showed diffuse bilateral cervical adenopathy measuring up to 2.5 cm on the right.     6/17/2021 Biopsy    Right neck lymph node, Biopsy:     - Peripheral T-cell lymphoma, not otherwise specified, see note.     7/19/2021 - 11/3/2021 Chemotherapy    DOXOrubicin (ADRIAMYCIN), 101 mg, Intravenous, Once, 6 of 6 cycles  Administration: 100 mg (7/19/2021), 101 mg (8/9/2021), 101 mg (8/30/2021), 101 mg (9/20/2021), 101 mg (10/12/2021), 101 mg (11/1/2021)  pegfilgrastim (NEULASTA), 6 mg, Subcutaneous, Once, 1 of 1 cycle  Administration: 6 mg (11/3/2021)  pegfilgrastim (NEULASTA ONPRO), 6 mg, Subcutaneous, Once, 5 of 5 cycles  Administration: 6 mg (8/9/2021), 6 mg  (8/30/2021), 6 mg (9/20/2021), 6 mg (10/12/2021), 6 mg (11/1/2021)  cyclophosphamide (CYTOXAN) IVPB, 1,516 mg, Intravenous, Once, 6 of 6 cycles  Administration: 1,500 mg (7/19/2021), 1,500 mg (8/9/2021), 1,500 mg (8/30/2021), 1,500 mg (9/20/2021), 1,500 mg (10/12/2021), 1,500 mg (11/1/2021)  fosaprepitant (EMEND) IVPB, 150 mg, Intravenous, Once, 6 of 6 cycles  Administration: 150 mg (7/19/2021), 150 mg (8/9/2021), 150 mg (8/30/2021), 150 mg (9/20/2021), 150 mg (10/12/2021), 150 mg (11/1/2021)  brentuximab (ADCETRIS) IVPB, 154.5 mg, Intravenous, Once, 6 of 6 cycles  Administration: 150 mg (8/9/2021), 150 mg (8/30/2021), 150 mg (9/20/2021), 150 mg (10/12/2021), 150 mg (11/1/2021), 150 mg (7/23/2021)     7/23/2021 -  Cancer Staged    July 23, 2021 PET-CT showed marked decrease in the size of cervical and mediastinal lymph nodes compared to prior CT scan of May 28th 2021. Abdominal pelvic lymph nodes measure para aortic nodes up to 1.5 cm.  Inguinal nodes up to 2.4 cm and a lucent defect with sclerotic margins in the left femoral neck measuring 1.8 cm.  Although these lesions showed SUV of 2.1 max.       12/20/2021 Transplant    High-dose chemotherapy, autologous stem cell support.          Interval History: Clinically stable.  ECOG-  0 - Asymptomatic    Review of Systems   Constitutional:  Negative for appetite change, diaphoresis, fatigue and fever.   HENT:  Negative for sinus pain.    Eyes:  Negative for discharge.   Respiratory:  Negative for cough and shortness of breath.    Cardiovascular:  Negative for chest pain.   Gastrointestinal:  Negative for abdominal pain, constipation and diarrhea.   Endocrine: Negative for cold intolerance.   Genitourinary:  Negative for difficulty urinating and hematuria.   Musculoskeletal:  Negative for joint swelling.   Skin:  Negative for rash.   Allergic/Immunologic: Negative for environmental allergies.   Neurological:  Negative for dizziness and headaches.   Hematological:   "Negative for adenopathy.   Psychiatric/Behavioral:  Negative for agitation.        Past Medical History:   Diagnosis Date    Interstitial keratitis     Lymphoma, peripheral T-cell (HCC)      Patient Active Problem List   Diagnosis    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 (HCC)    Rash    Anxiety disorder due to medical condition    Elevated alkaline phosphatase level    Meningioma (HCC)       Current Outpatient Medications:     cholecalciferol (VITAMIN D3) 1,000 units tablet, Take 5 Units by mouth daily, Disp: , Rfl:     Multiple Vitamin (Multi-Day Vitamins) TABS, Take 1 tablet by mouth daily, Disp: , Rfl:     Omega-3 Fatty Acids (fish oil) 1,000 mg, Take 1,000 mg by mouth daily (Patient not taking: Reported on 8/5/2024), Disp: , Rfl:     prednisoLONE acetate (PRED FORTE) 1 % ophthalmic suspension, ONE DROP LEFT EYE 4 TIMES A DAY (Patient not taking: Reported on 1/15/2024), Disp: , Rfl:     sertraline (Zoloft) 50 mg tablet, Take 50 mg by mouth daily (Patient not taking: Reported on 8/5/2024), Disp: , Rfl:   No Known Allergies  Past Surgical History:   Procedure Laterality Date    IR PORT PLACEMENT  07/13/2021    LIMBAL STEM CELL TRANSPLANT N/A 12/2021    [place]    LYMPH NODE BIOPSY Right 06/17/2021    Procedure: EXCISIONAL BIOPSY OF RIGHT NECK LYMPH NODE WITH LYMPHOMA PROTOCOL;  Surgeon: Alla Ramirez MD;  Location: [facility];  Service: Surgical Oncology    [place] RMVL JOSE CTR VAD W/SUBQ PORT/ CTR/PRPH INSJ N/A 7/25/2023    Procedure: REMOVAL VENOUS PORT (PORT-A-CATH)IR;  Surgeon: Hernesto Carmen DO;  [facility];  Service: Interventional Radiology    WISDOM TOOTH EXTRACTION       Social History     Objective:  Vitals:    08/05/24 0828   BP: 124/82   BP Location: Left arm   Patient Position: Sitting   Cuff Size: Adult   Pulse: 89   Resp: 17   Temp: 97.6 °F (36.4 °C)   SpO2: 97%   Weight: 95.7 kg (211 lb)   Height: 5' 9\" (1.753 m)     Physical Exam  Constitutional:       " Appearance: Dion is well-developed.   HENT:      Head: Normocephalic and atraumatic.      Mouth/Throat:      Mouth: Mucous membranes are moist.   Eyes:      Pupils: Pupils are equal, round, and reactive to light.   Cardiovascular:      Rate and Rhythm: Normal rate and regular rhythm.      Heart sounds: No murmur heard.  Pulmonary:      Breath sounds: Normal breath sounds. No wheezing or rales.   Abdominal:      Palpations: Abdomen is soft.      Tenderness: There is no abdominal tenderness.   Musculoskeletal:         General: No tenderness. Normal range of motion.      Cervical back: Neck supple.   Lymphadenopathy:      Cervical: No cervical adenopathy.   Skin:     Findings: No erythema or rash.   Neurological:      Mental Status: Dion is alert and oriented to person, place, and time.      Cranial Nerves: No cranial nerve deficit.      Deep Tendon Reflexes: Reflexes are normal and symmetric.   Psychiatric:         Behavior: Behavior normal.           Labs:  I personally reviewed the labs and imaging pertinent to this patient care.

## 2025-08-01 ENCOUNTER — HOSPITAL ENCOUNTER (OUTPATIENT)
Dept: RADIOLOGY | Facility: HOSPITAL | Age: 45
Discharge: HOME | End: 2025-08-01
Attending: INTERNAL MEDICINE
Payer: COMMERCIAL

## 2025-08-01 DIAGNOSIS — D32.9 MENINGIOMA (HCC): ICD-10-CM

## 2025-08-01 DIAGNOSIS — C84.48 PERIPHERAL T-CELL LYMPHOMA OF LYMPH NODES OF MULTIPLE REGIONS (HCC): ICD-10-CM

## 2025-08-01 PROCEDURE — 70553 MRI BRAIN STEM W/O & W/DYE: CPT

## 2025-08-01 PROCEDURE — A9585 GADOBUTROL INJECTION: HCPCS | Performed by: INTERNAL MEDICINE

## 2025-08-01 RX ORDER — GADOBUTROL 604.72 MG/ML
9 INJECTION INTRAVENOUS
Status: COMPLETED | OUTPATIENT
Start: 2025-08-01 | End: 2025-08-01

## 2025-08-01 RX ADMIN — GADOBUTROL 9 ML: 604.72 INJECTION INTRAVENOUS at 18:00

## 2025-08-02 ENCOUNTER — APPOINTMENT (OUTPATIENT)
Dept: LAB | Facility: CLINIC | Age: 45
End: 2025-08-02
Attending: INTERNAL MEDICINE
Payer: COMMERCIAL

## 2025-08-02 DIAGNOSIS — C84.48 PERIPHERAL T-CELL LYMPHOMA OF LYMPH NODES OF MULTIPLE REGIONS (HCC): ICD-10-CM

## 2025-08-02 DIAGNOSIS — D32.9 MENINGIOMA (HCC): ICD-10-CM

## 2025-08-02 LAB
ALBUMIN SERPL BCG-MCNC: 4.5 G/DL (ref 3.5–5)
ALP SERPL-CCNC: 100 U/L (ref 34–104)
ALT SERPL W P-5'-P-CCNC: 23 U/L (ref 7–52)
ANION GAP SERPL CALCULATED.3IONS-SCNC: 15 MMOL/L (ref 4–13)
AST SERPL W P-5'-P-CCNC: 24 U/L (ref 13–39)
BASOPHILS # BLD AUTO: 0.08 THOUSANDS/ÂΜL (ref 0–0.1)
BASOPHILS NFR BLD AUTO: 1 % (ref 0–1)
BILIRUB SERPL-MCNC: 0.67 MG/DL (ref 0.2–1)
BUN SERPL-MCNC: 10 MG/DL (ref 5–25)
CALCIUM SERPL-MCNC: 10.1 MG/DL (ref 8.4–10.2)
CHLORIDE SERPL-SCNC: 105 MMOL/L (ref 96–108)
CO2 SERPL-SCNC: 23 MMOL/L (ref 21–32)
CREAT SERPL-MCNC: 0.91 MG/DL (ref 0.6–1.3)
EOSINOPHIL # BLD AUTO: 0.37 THOUSAND/ÂΜL (ref 0–0.61)
EOSINOPHIL NFR BLD AUTO: 5 % (ref 0–6)
ERYTHROCYTE [DISTWIDTH] IN BLOOD BY AUTOMATED COUNT: 12.4 % (ref 11.6–15.1)
GFR SERPL CREATININE-BSD FRML MDRD: 102 ML/MIN/1.73SQ M
GLUCOSE P FAST SERPL-MCNC: 94 MG/DL (ref 65–99)
HCT VFR BLD AUTO: 47.3 % (ref 36.5–49.3)
HGB BLD-MCNC: 16.2 G/DL (ref 12–17)
IMM GRANULOCYTES # BLD AUTO: 0.01 THOUSAND/UL (ref 0–0.2)
IMM GRANULOCYTES NFR BLD AUTO: 0 % (ref 0–2)
LYMPHOCYTES # BLD AUTO: 2.63 THOUSANDS/ÂΜL (ref 0.6–4.47)
LYMPHOCYTES NFR BLD AUTO: 37 % (ref 14–44)
MCH RBC QN AUTO: 33.8 PG (ref 26.8–34.3)
MCHC RBC AUTO-ENTMCNC: 34.2 G/DL (ref 31.4–37.4)
MCV RBC AUTO: 99 FL (ref 82–98)
MONOCYTES # BLD AUTO: 0.51 THOUSAND/ÂΜL (ref 0.17–1.22)
MONOCYTES NFR BLD AUTO: 7 % (ref 4–12)
NEUTROPHILS # BLD AUTO: 3.53 THOUSANDS/ÂΜL (ref 1.85–7.62)
NEUTS SEG NFR BLD AUTO: 50 % (ref 43–75)
NRBC BLD AUTO-RTO: 0 /100 WBCS
PLATELET # BLD AUTO: 219 THOUSANDS/UL (ref 149–390)
PMV BLD AUTO: 9.9 FL (ref 8.9–12.7)
POTASSIUM SERPL-SCNC: 4.8 MMOL/L (ref 3.5–5.3)
PROT SERPL-MCNC: 7.1 G/DL (ref 6.4–8.4)
RBC # BLD AUTO: 4.79 MILLION/UL (ref 3.88–5.62)
SODIUM SERPL-SCNC: 143 MMOL/L (ref 135–147)
WBC # BLD AUTO: 7.13 THOUSAND/UL (ref 4.31–10.16)

## 2025-08-02 PROCEDURE — 80053 COMPREHEN METABOLIC PANEL: CPT

## 2025-08-02 PROCEDURE — 36415 COLL VENOUS BLD VENIPUNCTURE: CPT

## 2025-08-02 PROCEDURE — 85025 COMPLETE CBC W/AUTO DIFF WBC: CPT

## 2025-08-05 ENCOUNTER — TELEPHONE (OUTPATIENT)
Dept: HEMATOLOGY ONCOLOGY | Facility: CLINIC | Age: 45
End: 2025-08-05

## 2025-08-07 ENCOUNTER — OFFICE VISIT (OUTPATIENT)
Dept: HEMATOLOGY ONCOLOGY | Facility: CLINIC | Age: 45
End: 2025-08-07
Payer: COMMERCIAL

## 2025-08-07 VITALS
BODY MASS INDEX: 32.14 KG/M2 | DIASTOLIC BLOOD PRESSURE: 82 MMHG | RESPIRATION RATE: 17 BRPM | TEMPERATURE: 97.9 F | OXYGEN SATURATION: 97 % | WEIGHT: 217 LBS | HEIGHT: 69 IN | HEART RATE: 83 BPM | SYSTOLIC BLOOD PRESSURE: 110 MMHG

## 2025-08-07 DIAGNOSIS — C84.48 PERIPHERAL T-CELL LYMPHOMA OF LYMPH NODES OF MULTIPLE REGIONS (HCC): Primary | ICD-10-CM

## 2025-08-07 DIAGNOSIS — D32.9 MENINGIOMA (HCC): ICD-10-CM

## 2025-08-07 PROCEDURE — 99213 OFFICE O/P EST LOW 20 MIN: CPT | Performed by: INTERNAL MEDICINE

## (undated) DEVICE — SUT SILK 2-0 SH 30 IN K833H

## (undated) DEVICE — PAD GROUNDING ADULT

## (undated) DEVICE — ADHESIVE SKIN HIGH VISCOSITY EXOFIN 1ML

## (undated) DEVICE — CHLORAPREP HI-LITE 26ML ORANGE

## (undated) DEVICE — DRAPE TOWEL: Brand: CONVERTORS

## (undated) DEVICE — NEEDLE 22 G X 1 1/2 SAFETY

## (undated) DEVICE — SUT MONOCRYL 4-0 PS-2 18 IN Y496G

## (undated) DEVICE — PLUMEPEN PRO 10FT

## (undated) DEVICE — SURGICEL SNOW 1 X 2 IN

## (undated) DEVICE — TELFA NON-ADHERENT ABSORBENT DRESSING: Brand: TELFA

## (undated) DEVICE — SPECIMEN CONTAINER STERILE PEEL PACK

## (undated) DEVICE — MEDI-VAC YANK SUCT HNDL W/TPRD BULBOUS TIP: Brand: CARDINAL HEALTH

## (undated) DEVICE — ANTIBACTERIAL UNDYED BRAIDED (POLYGLACTIN 910), SYNTHETIC ABSORBABLE SUTURE: Brand: COATED VICRYL

## (undated) DEVICE — 3M™ STERI-STRIP™ REINFORCED ADHESIVE SKIN CLOSURES, R1547, 1/2 IN X 4 IN (12 MM X 100 MM), 6 STRIPS/ENVELOPE: Brand: 3M™ STERI-STRIP™

## (undated) DEVICE — SUT VICRYL 3-0 SH 27 IN J416H

## (undated) DEVICE — MINOR PROCEDURE DRAPE: Brand: CONVERTORS

## (undated) DEVICE — BETHLEHEM UNIVERSAL MINOR GEN: Brand: CARDINAL HEALTH

## (undated) DEVICE — STERILE ICS MINOR PACK: Brand: CARDINAL HEALTH

## (undated) DEVICE — GLOVE SRG BIOGEL 6.5

## (undated) DEVICE — SURGICEL 4 X 8

## (undated) DEVICE — GLOVE INDICATOR PI UNDERGLOVE SZ 7 BLUE

## (undated) DEVICE — DECANTER: Brand: UNBRANDED

## (undated) DEVICE — INTENDED FOR TISSUE SEPARATION, AND OTHER PROCEDURES THAT REQUIRE A SHARP SURGICAL BLADE TO PUNCTURE OR CUT.: Brand: BARD-PARKER SAFETY BLADES SIZE 15, STERILE

## (undated) DEVICE — TUBING SUCTION 5MM X 12 FT

## (undated) DEVICE — 3000CC GUARDIAN II: Brand: GUARDIAN

## (undated) DEVICE — SPONGE 4 X 4 XRAY 16 PLY STRL LF RFD